# Patient Record
Sex: MALE | Race: WHITE | Employment: OTHER | URBAN - METROPOLITAN AREA
[De-identification: names, ages, dates, MRNs, and addresses within clinical notes are randomized per-mention and may not be internally consistent; named-entity substitution may affect disease eponyms.]

---

## 2017-06-05 ENCOUNTER — OFFICE VISIT (OUTPATIENT)
Dept: ORTHOPEDIC SURGERY | Age: 82
End: 2017-06-05

## 2017-06-05 VITALS
DIASTOLIC BLOOD PRESSURE: 74 MMHG | HEART RATE: 55 BPM | WEIGHT: 216 LBS | BODY MASS INDEX: 30.92 KG/M2 | SYSTOLIC BLOOD PRESSURE: 169 MMHG | HEIGHT: 70 IN

## 2017-06-05 DIAGNOSIS — M25.421 ELBOW SWELLING, RIGHT: Primary | ICD-10-CM

## 2017-06-05 DIAGNOSIS — M70.21 OLECRANON BURSITIS OF RIGHT ELBOW: ICD-10-CM

## 2017-06-05 RX ORDER — BETAMETHASONE SODIUM PHOSPHATE AND BETAMETHASONE ACETATE 3; 3 MG/ML; MG/ML
9 INJECTION, SUSPENSION INTRA-ARTICULAR; INTRALESIONAL; INTRAMUSCULAR; SOFT TISSUE ONCE
Qty: 1 VIAL | Refills: 0
Start: 2017-06-05 | End: 2017-06-05

## 2017-06-05 NOTE — PROGRESS NOTES
Patient: Taj Bajwa                MRN: [id number]       SSN: [id number]  YOB: 1935        AGE: 81 y.o. SEX: male  There is no height or weight on file to calculate BMI. PCP: Allan Reyes MD  06/05/17      No chief complaint on file. HISTORY OF PRESENT ILLNESS: Taj Bajwa is a 81 y.o. male who presents to the office for recurrent swelling of the posterior right elbow. It was aspirated on 2 separate occassions by the PCP to only have the fluid gradually return. He has had no falls or other trauma. He denies any pain. It is manly the cosmetic aspect of the problem that bothers him. Review of Systems   Constitutional: Negative. HENT: Negative. Eyes: Negative. Respiratory: Negative. Cardiovascular: Negative. Gastrointestinal: Negative. Genitourinary: Negative. Musculoskeletal: Positive for joint pain (right elbow). Skin: Negative. Neurological: Negative. Endo/Heme/Allergies: Negative. Psychiatric/Behavioral: Negative. Social History     Social History    Marital status:      Spouse name: N/A    Number of children: N/A    Years of education: N/A     Occupational History    Not on file. Social History Main Topics    Smoking status: Never Smoker    Smokeless tobacco: Never Used    Alcohol use No    Drug use: No    Sexual activity: Not on file     Other Topics Concern    Not on file     Social History Narrative        Past Medical History:   Diagnosis Date    Arrhythmia 2012    PAROX AFIB    Arthritis     CAD (coronary artery disease)     Cancer ([facility] 2000    BLADDER    Hypertension     Sleep apnea     NO CPAP    [facility]    NO RESIDUAL        Allergies   Allergen Reactions    Oxycodone Other (comments)     VOMITTING         Current Outpatient Prescriptions   Medication Sig    diphenhydrAMINE (BENADRYL) 25 mg capsule Take 25 mg by mouth nightly as needed.     amiodarone (CORDARONE) 200 mg tablet Take  by mouth daily.  omega-3 fatty acids-vitamin e 1,000 mg cap Take 1 Cap by mouth.  ramipril (ALTACE) 5 mg capsule Take  by mouth daily (with dinner).  rosuvastatin (CRESTOR) 40 mg tablet Take 40 mg by mouth nightly.  rivaroxaban (XARELTO) 20 mg tab tablet Take  by mouth daily (with dinner).  metoprolol succinate (TOPROL-XL) 50 mg XL tablet Take  by mouth daily (with dinner). No current facility-administered medications for this visit. Physical Exam  Constitutional: he is oriented to person, place, and time and well-developed, well-nourished, and in no distress. No distress. HENT:   Head: Normocephalic and atraumatic. Right Ear: Hearing normal.   Left Ear: Hearing normal.   Nose: Nose normal.   Eyes: Conjunctivae, EOM and lids are normal. Pupils are equal, round, and reactive to light. Neck: Trachea normal.   Pulmonary/Chest: Effort normal and breath sounds normal. No respiratory distress. Abdominal: Soft. Neurological: he is alert and oriented to person, place, and time. Skin: Skin is warm, dry and intact. he is not diaphoretic. Psychiatric: Affect normal.   Nursing note and vitals reviewed. Ortho Exam   Shows an aseptic right olecranon bursitis. Elbow ROM is normal. Palpation over the bursa reveals what appears to be a bone spur on the olecranon. Motor strength and sensation for the RUE were normal.     Procedure: After timeout and under sterile conditions, patient's right elbow was injected with 1.5 cc of Celestone.     [facility] [place]  OFFICE PROCEDURE PROGRESS NOTE        Chart reviewed for the following:   Kalpana Mckay MD, have reviewed the History, Physical and updated the Allergic reactions for [place] performed immediately prior to start of procedure:   Kalpnaa Mckay MD, have performed the following reviews on [place] [facility] prior to the start of the procedure: * Patient was identified by name and date of birth   * Agreement on procedure being performed was verified  * Risks and Benefits explained to the patient  * Procedure site verified and marked as necessary  * Patient was positioned for comfort  * Consent was signed and verified     Time: 4:45 PM        Date of procedure: 6/5/2017    Procedure performed by: Tonya Le MD    Provider assisted by: None     Patient assisted by: self    How tolerated by patient: tolerated the procedure well with no complications    Comments: none      RADIOGRAPHS:  Ap and lateral views show large osteophyte on the tip of the olecranon. IMPRESSION & PLAN: Aseptic olecranon bursitis secondary to the bone spur that is irritating the bursal lining. After discussing treatment options the pt wishes to undergo aspiration. This was performed after which 1.5 Celestone was injected to decrease the inflammation of the bursa lining. I explained if this continues to return the only option is to remove the current bursa and allow a new bursa to form. I will see him back prn. Written by Prashant Doe, as dictated by Dr. Jamarcus Mccracken, Dr. Tonya Le, confirm that all documentation is accurate.

## 2017-06-26 ENCOUNTER — OFFICE VISIT (OUTPATIENT)
Dept: ORTHOPEDIC SURGERY | Age: 82
End: 2017-06-26

## 2017-06-26 VITALS
WEIGHT: 218 LBS | SYSTOLIC BLOOD PRESSURE: 137 MMHG | DIASTOLIC BLOOD PRESSURE: 68 MMHG | HEIGHT: 70 IN | HEART RATE: 54 BPM | BODY MASS INDEX: 31.21 KG/M2

## 2017-06-26 DIAGNOSIS — M51.36 DEGENERATIVE DISC DISEASE, LUMBAR: Primary | ICD-10-CM

## 2017-06-26 DIAGNOSIS — M47.816 ARTHRITIS OF LUMBAR SPINE: ICD-10-CM

## 2017-06-26 DIAGNOSIS — G89.29 CHRONIC RIGHT-SIDED LOW BACK PAIN WITHOUT SCIATICA: ICD-10-CM

## 2017-06-26 DIAGNOSIS — M54.50 CHRONIC RIGHT-SIDED LOW BACK PAIN WITHOUT SCIATICA: ICD-10-CM

## 2017-06-26 RX ORDER — METHYLPREDNISOLONE 4 MG/1
TABLET ORAL
Qty: 20 DOSE PACK | Refills: 0 | Status: SHIPPED | OUTPATIENT
Start: 2017-06-26 | End: 2021-06-03

## 2017-06-26 NOTE — PROGRESS NOTES
Patient: Hannah Smith                MRN: [id number]       SSN: [id number]  YOB: 1935        AGE: 81 y.o. SEX: male  There is no height or weight on file to calculate BMI. PCP: Kevin Jeffers MD  06/26/17      No chief complaint on file. HISTORY OF PRESENT ILLNESS: Hannah Smith is a 81 y.o. male who presents to the office for flare up of his back pain and was riding in a tractor trailer. He was bumping up and down and this caused the onset of his back pain. He was able to allow the pain resolve by sitting in a reclining chair but after going to the beach it flared back up. It is localized in the right lumbar region. He has no radicular symptoms radiating into his LE's. Review of Systems   Constitutional: Negative. HENT: Negative. Eyes: Negative. Respiratory: Negative. Cardiovascular: Negative. Gastrointestinal: Negative. Genitourinary: Negative. Musculoskeletal: Positive for back pain. Skin: Negative. Neurological: Negative. Endo/Heme/Allergies: Negative. Psychiatric/Behavioral: Negative. Social History     Social History    Marital status:      Spouse name: N/A    Number of children: N/A    Years of education: N/A     Occupational History    Not on file.      Social History Main Topics    Smoking status: Never Smoker    Smokeless tobacco: Never Used    Alcohol use No    Drug use: No    Sexual activity: Not on file     Other Topics Concern    Not on file     Social History Narrative        Past Medical History:   Diagnosis Date    Arrhythmia 2012    PAROX AFIB    Arthritis     CAD (coronary artery disease)     Cancer ([facility] [address] [facility] 2000    BLADDER    Hypertension     Sleep apnea     NO CPAP    [facility]    NO RESIDUAL        Allergies   Allergen Reactions    Oxycodone Other (comments)     VOMITTING         Current Outpatient Prescriptions   Medication Sig    diphenhydrAMINE (BENADRYL) 25 mg capsule Take 25 mg by mouth nightly as needed.  amiodarone (CORDARONE) 200 mg tablet Take  by mouth daily.  omega-3 fatty acids-vitamin e 1,000 mg cap Take 1 Cap by mouth.  ramipril (ALTACE) 5 mg capsule Take  by mouth daily (with dinner).  rosuvastatin (CRESTOR) 40 mg tablet Take 40 mg by mouth nightly.  rivaroxaban (XARELTO) 20 mg tab tablet Take  by mouth daily (with dinner).  metoprolol succinate (TOPROL-XL) 50 mg XL tablet Take  by mouth daily (with dinner). No current facility-administered medications for this visit. Physical Exam  Constitutional: he is oriented to person, place, and time and well-developed, well-nourished, and in no distress. No distress. HENT:   Head: Normocephalic and atraumatic. Right Ear: Hearing normal.   Left Ear: Hearing normal.   Nose: Nose normal.   Eyes: Conjunctivae, EOM and lids are normal. Pupils are equal, round, and reactive to light. Neck: Trachea normal.   Pulmonary/Chest: Effort normal and breath sounds normal. No respiratory distress. Abdominal: Soft. Neurological: he is alert and oriented to person, place, and time. Skin: Skin is warm, dry and intact. he is not diaphoretic. Psychiatric: Affect normal.   Nursing note and vitals reviewed. Ortho Exam   Shows that leaning to the right significantly increased his discomfort. Back extension caused mild discomfort. Forward flexion was pain free. Sitting SLR was negative to 90 degrees. Sensation in both LE was normal and sensation for both LE was normal.     RADIOGRAPHS:   XR's from [place] [facility] showed the same severe degenerative changes with no new findings. 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  We will repeat the medrol dose but darling again I reccommended him to avoid any activity that puts stress on his back. I will see him back prn. Written by Gali Levi, as dictated by Dr. Greenfield Poster, Dr. Jae Cushing, confirm that all documentation is accurate.

## 2017-08-03 ENCOUNTER — OFFICE VISIT (OUTPATIENT)
Dept: ORTHOPEDIC SURGERY | Age: 82
End: 2017-08-03

## 2017-08-03 VITALS
BODY MASS INDEX: 30.9 KG/M2 | HEART RATE: 56 BPM | HEIGHT: 70 IN | RESPIRATION RATE: 18 BRPM | SYSTOLIC BLOOD PRESSURE: 113 MMHG | WEIGHT: 215.8 LBS | OXYGEN SATURATION: 94 % | DIASTOLIC BLOOD PRESSURE: 58 MMHG

## 2017-08-03 DIAGNOSIS — M70.21 OLECRANON BURSITIS OF RIGHT ELBOW: Primary | ICD-10-CM

## 2017-08-03 RX ORDER — TRIAMCINOLONE ACETONIDE 40 MG/ML
40 INJECTION, SUSPENSION INTRA-ARTICULAR; INTRAMUSCULAR ONCE
Qty: 1 ML | Refills: 0
Start: 2017-08-03 | End: 2017-08-03

## 2017-08-03 NOTE — PROGRESS NOTES
HISTORY:  Nghia Keys returns for followup regarding his right elbow. He has been plagued with a recurrence of swelling to the right elbow consistent with olecranon bursitis. He has been treated under the care of Dr. Graham Roberts in the past and had prior aspirations with followup Cortisone injections. He is a  by [employer]. 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 He believes that resting his arm on the side of the machine while operating causes pressure and therefore leads to his increased swelling. With swelling he has pain and limitation of motion of the right elbow. 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 He would like to entertain surgical debridement and removal/bursectomy so to avoid any further reoccurrence. REVIEW OF SYSTEMS:  No chest pain. He does have exertional dyspnea, chronic in nature. No fevers, chills, or night sweats. He does have A-fib. He is on Xarelto. He is not a diabetic. He has no allergies to Betadine. He has no nausea or vomiting. EXAMINATION:  Physical examination today reveals an 81year-old, generally fit,  male, atraumatic and normocephalic. He is alert and oriented x 3. He is joined by his wife today. Examination to the right upper extremity reveals about the shoulder a well-healed surgical incision, non-tender. There is no evidence of erythema associated with the right upper extremity to include the elbow. There is a 2+ effusion about the right elbow. His right elbow range of motion actively is 110° to - 5°. He has no induration associated with the effusion. There is no lymphangetic streaking associated. No axillary lymphadenopathy.      TREATMENT:  Today, using sterile technique after verbal and written consent was obtained, risks and benefits discussed and all questions were answered to his satisfaction with appropriate time-out performed, 5 cc of Lidocaine 1% was used to anesthetize the right elbow with the patient in the supine position. There were no complications. To follow, 12 cc of thin bloody aspirate removed. To follow, 2 mL of 0.5% Marcaine mixed with 1 cc of Kenalog injected into the olecranon bursa region. There were no complications. IMPRESSION:   1. 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 2. Right elbow effusion. 3. Right elbow range of motion decreased secondary to above. 4. A-fib. Patient is stable on Xarelto. PLAN:  I would like to aspirate the elbow today and provide him with relief by placing a small dose of Cortisone. In addition, in light of his multiple recurrences, he would be a candidate after being cleared by cardiology for a right elbow bursectomy, outpatient. We discussed the procedure today, and all of his questions were answered to his satisfaction. Patient will be scheduled with Dr. Kassie Miller for evaluation and open elective olecranon bursectomy of the right elbow. All of the patient's and his wife's questions were answered to their satisfaction.

## 2017-08-03 NOTE — MR AVS SNAPSHOT
Visit Information Date & Time Provider Department Dept. Phone Encounter #  
 8/3/2017  8:45 AM Александр Landeros, [address] [facility] [phone] [id number] Upcoming Health Maintenance Date Due DTaP/Tdap/Td series (1 - Tdap) 3/29/1957 ZOSTER VACCINE AGE 60> 1/29/1996 GLAUCOMA SCREENING Q2Y 3/29/2001 Pneumococcal 65+ Low/Medium Risk (1 of 2 - PCV13) 3/29/2001 MEDICARE YEARLY EXAM 3/29/2001 INFLUENZA AGE 9 TO ADULT 8/1/2017 Allergies as of 8/3/2017  Review Complete On: 8/3/2017 By: Bladimir Rossi LPN Severity Noted Reaction Type Reactions Oxycodone  05/05/2016    Other (comments) VOMITTING Current Immunizations  Never Reviewed No immunizations on file. Not reviewed this visit Vitals BP Pulse Resp Height(growth percentile) Weight(growth percentile) SpO2  
 113/58 (!) 56 18 5' 10\" (1.778 m) 215 lb 12.8 oz (97.9 kg) 94% BMI Smoking Status 30.96 kg/m2 Never Smoker BMI and BSA Data Body Mass Index Body Surface Area 30.96 kg/m 2 2.2 m 2 Preferred Pharmacy Pharmacy Name Phone [id number] [address] [facility] [phone] Your Updated Medication List  
  
   
This list is accurate as of: 8/3/17  9:38 AM.  Always use your most recent med list.  
  
  
  
  
 amiodarone 200 mg tablet Commonly known as:  CORDARONE Take  by mouth daily. diphenhydrAMINE 25 mg capsule Commonly known as:  BENADRYL Take 25 mg by mouth nightly as needed. methylPREDNISolone 4 mg tablet Commonly known as:  Sharyn Sabino Per dose pack instructions  
  
 metoprolol succinate 50 mg XL tablet Commonly known as:  TOPROL-XL Take  by mouth daily (with dinner). omega-3 fatty acids-vitamin e 1,000 mg Cap Take 1 Cap by mouth. ramipril 5 mg capsule Commonly known as:  ALTACE  
 Take  by mouth daily (with dinner). rivaroxaban 20 mg Tab tablet Commonly known as:  [place] Take  by mouth daily (with dinner). rosuvastatin 40 mg tablet Commonly known as:  CRESTOR Take 40 mg by mouth nightly. Introducing [facility]! [facility] introduces [employer] patient portal. Now you can access parts of your medical record, email your doctor's office, and request medication refills online. 1. In your internet browser, go to [email or web address] 2. Click on the First Time User? Click Here link in the Sign In box. You will see the New Member Sign Up page. 3. Enter your [employer] Access Code exactly as it appears below. You will not need to use this code after youve completed the sign-up process. If you do not sign up before the expiration date, you must request a new code. · [employer] Access Code: [email or web address] Expires: 9/3/2017  3:04 PM 
 
4. Enter the last four digits of your Social Security Number (xxxx) and Date of Birth (mm/dd/yyyy) as indicated and click Submit. You will be taken to the next sign-up page. 5. Create a [employer] ID. This will be your [employer] login ID and cannot be changed, so think of one that is secure and easy to remember. 6. Create a [employer] password. You can change your password at any time. 7. Enter your Password Reset Question and Answer. This can be used at a later time if you forget your password. 8. Enter your e-mail address. You will receive e-mail notification when new information is available in [address]. 9. Click Sign Up. You can now view and download portions of your medical record. 10. Click the Download Summary menu link to download a portable copy of your medical information. If you have questions, please visit the Frequently Asked Questions section of the [employer] website. Remember, [employer] is NOT to be used for urgent needs. For medical emergencies, dial 911. Now available from your iPhone and Android! Please provide this summary of care documentation to your next provider. Your primary care clinician is listed as Barbie Solis. If you have any questions after today's visit, please call [phone].

## 2017-08-09 ENCOUNTER — OFFICE VISIT (OUTPATIENT)
Dept: ORTHOPEDIC SURGERY | Age: 82
End: 2017-08-09

## 2017-08-09 VITALS
HEART RATE: 54 BPM | WEIGHT: 214.8 LBS | HEIGHT: 70 IN | DIASTOLIC BLOOD PRESSURE: 66 MMHG | BODY MASS INDEX: 30.75 KG/M2 | SYSTOLIC BLOOD PRESSURE: 132 MMHG | OXYGEN SATURATION: 97 % | TEMPERATURE: 96 F | RESPIRATION RATE: 18 BRPM

## 2017-08-09 DIAGNOSIS — M70.21 OLECRANON BURSITIS OF RIGHT ELBOW: Primary | ICD-10-CM

## 2017-08-09 DIAGNOSIS — M25.421 ELBOW SWELLING, RIGHT: ICD-10-CM

## 2017-08-09 NOTE — PROGRESS NOTES
Patient: Messi Jaramillo                MRN: [id number]       SSN: [id number]  YOB: 1935        AGE: 81 y.o. SEX: male    PCP: Skinny Borges MD  08/09/17    Chief Complaint   Patient presents with    Elbow Pain     Right     HISTORY:  Messi Jaramillo is a 81 y.o. male who is seen for right elbow effusion. He was previously seen by Dr. Brent Obando and NIYAH Cornejo on 8/3/17. He has had his right olecranon bursa aspirated four times total. He reports he has had 2 cortisone injections. He states he has been experiencing recurrent swelling of the olecranon bursa for the past year. He denies any pain in his right elbow. He reports the swelling comes back in a few days every time he has his elbow aspirated. He and his wife states they are more bothered by the appearance of the swelling than any other symptomotology. He is s/p successful right reverse shoulder arthroplasty at [facility] by Dr. May Frias. Pain Assessment  6/26/2017   Location of Pain Back   Location Modifiers -   Severity of Pain 0   Quality of Pain Aching   Duration of Pain -   Frequency of Pain Intermittent   Aggravating Factors Standing;Walking   Limiting Behavior Some   Relieving Factors Rest   Result of Injury No     Occupation, etc:  Mr. Deidre Fofana he works part time for [employer]. 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 His wife states he is a workaholic. He lives with his wife in [place]. They have three adult sons and 7 grandchildren. One son works as a supervisor at the [employer], another at the [employer]. The youngest son (Donald) owns the [employer]. Current weight is 214 pounds. He is 5'10\" tall. He is not hypertensive or diabetic. Patient is hearing impaired.     No results found for: HBA1C, HGBE8, LTS6BTVX, IRG7WRUU, ZVV7AFPV  Weight Metrics 8/9/2017 8/3/2017 6/26/2017 6/5/2017 5/9/2016 4/18/2016 4/11/2016   Weight 214 lb 12.8 oz 215 lb 12.8 oz 218 lb 216 lb 207 lb 213 lb 215 lb   BMI 30.82 kg/m2 30.96 kg/m2 31.28 kg/m2 30.99 kg/m2 29.7 kg/m2 29.72 kg/m2 30 kg/m2       There is no problem list on file for this patient. REVIEW OF SYSTEMS: All Below are Negative except: See HPI   Constitutional: negative for fever, chills, and weight loss. Cardiovascular: negative for chest pain, claudication, leg swelling, SOB, ENCARNACION   Gastrointestinal: Negative for pain, N/V/C/D, Blood in stool or urine, dysuria,  hematuria, incontinence, pelvic pain. Musculoskeletal: See HPI   Neurological: Negative for dizziness and weakness. Negative for headaches, Visual changes, confusion, seizures   Phychiatric/Behavioral: Negative for depression, memory loss, substance  abuse. Extremities: Negative for hair changes, rash, or skin lesion changes. Hematologic: Negative for bleeding problems, bruising, pallor or swollen lymph  nodes   Peripheral Vascular: No calf pain, no circulation deficits. Social History     Social History    Marital status:      Spouse name: N/A    Number of children: N/A    Years of education: N/A     Occupational History    Not on file. Social History Main Topics    Smoking status: Never Smoker    Smokeless tobacco: Never Used    Alcohol use No    Drug use: No    Sexual activity: Not on file     Other Topics Concern    Not on file     Social History Narrative      Allergies   Allergen Reactions    Oxycodone Other (comments)     VOMITTING      Current Outpatient Prescriptions   Medication Sig    diphenhydrAMINE (BENADRYL) 25 mg capsule Take 25 mg by mouth nightly as needed.  amiodarone (CORDARONE) 200 mg tablet Take  by mouth daily.  omega-3 fatty acids-vitamin e 1,000 mg cap Take 1 Cap by mouth.  ramipril (ALTACE) 5 mg capsule Take  by mouth daily (with dinner).     rosuvastatin (CRESTOR) 40 mg tablet Take 40 mg by mouth nightly.  rivaroxaban (XARELTO) 20 mg tab tablet Take  by mouth daily (with dinner).  metoprolol succinate (TOPROL-XL) 50 mg XL tablet Take  by mouth daily (with dinner).  methylPREDNISolone (MEDROL DOSEPACK) 4 mg tablet Per dose pack instructions (Patient not taking: Reported on 8/3/2017)     No current facility-administered medications for this visit. PHYSICAL EXAMINATION:  Visit Vitals    /66    Pulse (!) 54    Temp 96 °F (35.6 °C) (Oral)    Resp 18    Ht 5' 10\" (1.778 m)    Wt 214 lb 12.8 oz (97.4 kg)    SpO2 97%    BMI 30.82 kg/m2      ORTHO EXAMINATION:  Examination Right Elbow Left Elbow   Skin Intact Intact   Range of Motion 135-0 135-0   Tenderness - -   Swelling +, olecranon bursa -   Bruising - -   Stability Normal Normal   Motor Strength  Normal Normal   Neurovascular Intact Intact   Mild thickening of olecranon bursal sac  Prominent olecranon enthesmophyte  80/80 pronation/supination    RADIOGRAPHS:  XR RIGHT ELBOW 6/5/17  IMPRESSION:  Two views - No fractures, negative fat pad sign, no joint space narrowing. Large olecranon enthesmophyte. IMPRESSION:      ICD-10-CM ICD-9-CM    1. Olecranon bursitis of right elbow M70.21 726.33    2. Elbow swelling, right M25.421 719.02      PLAN:  We discussed a possible right olecranon bursa removal in the future if pain continues--symptoms do not warrant at this time. He will follow up as needed.       Scribed by Gissell Garza ([facility]) as dictated by Todd Tillman MD

## 2017-08-09 NOTE — MR AVS SNAPSHOT
Visit Information Date & Time Provider Department Dept. Phone Encounter #  
 8/9/2017  8:25 AM Lovey Kanner, MD [facility] [phone] Follow-up Instructions Return if symptoms worsen or fail to improve. Upcoming Health Maintenance Date Due DTaP/Tdap/Td series (1 - Tdap) 3/29/1957 ZOSTER VACCINE AGE 60> 1/29/1996 GLAUCOMA SCREENING Q2Y 3/29/2001 Pneumococcal 65+ Low/Medium Risk (1 of 2 - PCV13) 3/29/2001 MEDICARE YEARLY EXAM 3/29/2001 INFLUENZA AGE 9 TO ADULT 8/1/2017 Allergies as of 8/9/2017  Review Complete On: 8/9/2017 By: Gretta Rock Severity Noted Reaction Type Reactions Oxycodone  05/05/2016    Other (comments) VOMITTING Current Immunizations  Never Reviewed No immunizations on file. Not reviewed this visit You Were Diagnosed With   
  
 Codes Comments Olecranon bursitis of right elbow    -  Primary ICD-10-CM: M70.21 ICD-9-CM: 726.33 Elbow swelling, right     ICD-10-CM: M25.421 ICD-9-CM: 719.02 Vitals BP Pulse Temp Resp Height(growth percentile) Weight(growth percentile) 132/66 (!) 54 96 °F (35.6 °C) (Oral) 18 5' 10\" (1.778 m) 214 lb 12.8 oz (97.4 kg) SpO2 BMI Smoking Status 97% 30.82 kg/m2 Never Smoker BMI and BSA Data Body Mass Index Body Surface Area  
 30.82 kg/m 2 2.19 m 2 Preferred Pharmacy Pharmacy Name Phone [id number] [address] [facility] [phone] Your Updated Medication List  
  
   
This list is accurate as of: 8/9/17  9:01 AM.  Always use your most recent med list.  
  
  
  
  
 amiodarone 200 mg tablet Commonly known as:  CORDARONE Take  by mouth daily. diphenhydrAMINE 25 mg capsule Commonly known as:  BENADRYL Take 25 mg by mouth nightly as needed. methylPREDNISolone 4 mg tablet Commonly known as:  Lucian Doan Per dose pack instructions  
  
 metoprolol succinate 50 mg XL tablet Commonly known as:  TOPROL-XL Take  by mouth daily (with dinner). omega-3 fatty acids-vitamin e 1,000 mg Cap Take 1 Cap by mouth. ramipril 5 mg capsule Commonly known as:  ALTACE Take  by mouth daily (with dinner). rivaroxaban 20 mg Tab tablet Commonly known as:  Héctor Guidoe Take  by mouth daily (with dinner). rosuvastatin 40 mg tablet Commonly known as:  CRESTOR Take 40 mg by mouth nightly. Follow-up Instructions Return if symptoms worsen or fail to improve. Patient Instructions Elbow Bursitis: Exercises Your Care Instructions Here are some examples of typical rehabilitation exercises for your condition. Start each exercise slowly. Ease off the exercise if you start to have pain. Your doctor or physical therapist will tell you when you can start these exercises and which ones will work best for you. How to do the exercises Elbow flexion stretch 1. Lift the arm that bothers you, and bend the elbow. Your palm should face toward you. 2. With your other hand, gently push on the back of your affected forearm. Press your hand toward your shoulder until you feel a stretch in the back of your upper arm. 3. Hold for at least 15 to 30 seconds. 4. Repeat 2 to 4 times. Elbow extension stretch 1. Extend your affected arm in front of you with your palm facing away from you. 2. Bend back your wrist, pointing your hand up toward the ceiling. 3. With your other hand, gently bend your wrist farther until you feel a mild to moderate stretch in your forearm. 4. Hold for at least 15 to 30 seconds. 5. Repeat 2 to 4 times. 6. Repeat steps 1 through 5. But this time extend your affected arm in front of you with your palm facing up. Then bend back your wrist, pointing your hand toward the floor. Pronation and supination stretch 1. Keep your affected elbow at your side, bent at about 90 degrees. Grasp a pen, pencil, or stick, and wrap your hand around it. If you don't have something to hold on to, make a fist instead. 2. Slowly turn your forearm as far as you can back and forth in each direction. Your hand should face up and then down. 3. Hold each position for about 6 seconds. 4. Relax for up to 10 seconds between repetitions. 5. Repeat 8 to 12 times. Hand flips 1. While seated, place your affected forearm on your thigh. Your palm should face down. 2. Flip your hand over so the back of your hand rests on your thigh and your palm is up. Alternate between palm up and palm down while keeping your forearm on your thigh. 3. Repeat 8 to 12 times. Follow-up care is a key part of your treatment and safety. Be sure to make and go to all appointments, and call your doctor if you are having problems. It's also a good idea to know your test results and keep a list of the medicines you take. Where can you learn more? Go to http://johann-dudley.info/. Enter K053 in the search box to learn more about \"Elbow Bursitis: Exercises. \" Current as of: March 21, 2017 Content Version: 11.3 © 7545-2066 [employer]. Care instructions adapted under license by [employer] (which disclaims liability or warranty for this information). If you have questions about a medical condition or this instruction, always ask your healthcare professional. [place] [address] any warranty or liability for your use of this information. Introducing [facility] & HEALTH SERVICES! Cailin Meadows introduces [employer] patient portal. Now you can access parts of your medical record, email your doctor's office, and request medication refills online. 1. In your internet browser, go to https://[employer]. [employer] 2. Click on the First Time User? Click Here link in the Sign In box.  You will see the New Member Sign Up page. 3. Enter your [employer] Access Code exactly as it appears below. You will not need to use this code after youve completed the sign-up process. If you do not sign up before the expiration date, you must request a new code. · [employer] Access Code: [email or web address] Expires: 9/3/2017  3:04 PM 
 
4. Enter the last four digits of your Social Security Number (xxxx) and Date of Birth (mm/dd/yyyy) as indicated and click Submit. You will be taken to the next sign-up page. 5. Create a [employer] ID. This will be your [employer] login ID and cannot be changed, so think of one that is secure and easy to remember. 6. Create a [employer] password. You can change your password at any time. 7. Enter your Password Reset Question and Answer. This can be used at a later time if you forget your password. 8. Enter your e-mail address. You will receive e-mail notification when new information is available in [address]. 9. Click Sign Up. You can now view and download portions of your medical record. 10. Click the Download Summary menu link to download a portable copy of your medical information. If you have questions, please visit the Frequently Asked Questions section of the [employer] website. Remember, [employer] is NOT to be used for urgent needs. For medical emergencies, dial 911. Now available from your iPhone and Android! Please provide this summary of care documentation to your next provider. Your primary care clinician is listed as Nestor Bunch. If you have any questions after today's visit, please call [phone].

## 2017-11-03 ENCOUNTER — HOSPITAL ENCOUNTER (OUTPATIENT)
Dept: MRI IMAGING | Age: 82
Discharge: HOME | End: 2017-11-03
Attending: ORTHOPAEDIC SURGERY
Payer: MEDICARE

## 2017-11-03 DIAGNOSIS — M54.16 LUMBAR RADICULITIS: ICD-10-CM

## 2017-11-03 PROCEDURE — 72148 MRI LUMBAR SPINE W/O DYE: CPT

## 2019-04-29 ENCOUNTER — OFFICE VISIT (OUTPATIENT)
Dept: ORTHOPEDIC SURGERY | Facility: CLINIC | Age: 84
End: 2019-04-29

## 2019-04-29 VITALS
TEMPERATURE: 97.6 F | HEIGHT: 70 IN | HEART RATE: 65 BPM | DIASTOLIC BLOOD PRESSURE: 73 MMHG | SYSTOLIC BLOOD PRESSURE: 151 MMHG | WEIGHT: 202.6 LBS | OXYGEN SATURATION: 100 % | RESPIRATION RATE: 16 BRPM | BODY MASS INDEX: 29.01 KG/M2

## 2019-04-29 DIAGNOSIS — M48.062 SPINAL STENOSIS OF LUMBAR REGION WITH NEUROGENIC CLAUDICATION: ICD-10-CM

## 2019-04-29 DIAGNOSIS — M54.32 LEFT SIDED SCIATICA: Primary | ICD-10-CM

## 2019-04-29 DIAGNOSIS — M16.12 ARTHRITIS OF LEFT HIP: ICD-10-CM

## 2019-04-29 RX ORDER — BETAMETHASONE SODIUM PHOSPHATE AND BETAMETHASONE ACETATE 3; 3 MG/ML; MG/ML
6 INJECTION, SUSPENSION INTRA-ARTICULAR; INTRALESIONAL; INTRAMUSCULAR; SOFT TISSUE ONCE
Qty: 0.5 ML | Refills: 0
Start: 2019-04-29 | End: 2019-04-29

## 2019-04-29 RX ORDER — BETAMETHASONE SODIUM PHOSPHATE AND BETAMETHASONE ACETATE 3; 3 MG/ML; MG/ML
6 INJECTION, SUSPENSION INTRA-ARTICULAR; INTRALESIONAL; INTRAMUSCULAR; SOFT TISSUE ONCE
Qty: 1 VIAL | Refills: 0
Start: 2019-04-29 | End: 2019-04-29

## 2019-04-29 NOTE — PROGRESS NOTES
Patient: Hazel Cohen                MRN: [id number]       SSN: [id number] YOB: 1935        AGE: 81 y.o. SEX: male PCP: Judah Parker MD 
04/29/19 Chief Complaint Patient presents with  
 Hip Pain  
  left hip pain that travels down leg HISTORY:  Hazel Cohen is a 81 y.o. male who is seen for left buttock and left LE pain. He has been experiencing left buttock pain for the past four weeks. There is no history injury. He notes pain with standing and walking. He experiences no groin pain or lateral hip pain. He was seen at [facility] in [place] where an Xray was taken that revealed only mild hip arthritis. He underwent lumbar laminectomy in 1994 by a doctor in [place]. He also complains of numbness in his fingers on the left. He is being seen by Dr. Wade Stevenson for a left carpal tunnel release. He was previously seen by Dr. Newton Alvarado and NIYAH Harris on 8/3/17. He has had his right olecranon bursa aspirated four times total. He reports he has had 2 cortisone injections. He states he has been experiencing recurrent swelling of the olecranon bursa for the past year. He denies any pain in his right elbow. He reports the swelling comes back in a few days every time he has his elbow aspirated. He and his wife states they are more bothered by the appearance of the swelling than any other symptomotology. He is s/p [place] [facility] [place]eferino Zambrano. Pain Assessment  4/29/2019 Location of Pain Hip Location Modifiers Left Severity of Pain 9 Quality of Pain Dull Duration of Pain Persistent Frequency of Pain Intermittent Aggravating Factors Walking;Standing;Stairs Limiting Behavior Yes Relieving Factors Rest  
Result of Injury No  
 
Occupation, etc:  Mr. Joan Low he works part time for [employer].   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 His wife states he is a workaholic. He lives with his wife in [place]. They have three adult sons and 7 grandchildren. One son works as a supervisor at the [employer], another at the [employer]. The youngest son (Donald) owns the [address] [place] [address] [employer]. Current weight is 202 pounds. He is 5'10\" tall. He is not hypertensive or diabetic. Patient is hearing impaired. No results found for: HBA1C, HGBE8, AWW3OTBS, QQF4JGPS, ABN2TUIA Weight Metrics 4/29/2019 8/9/2017 8/3/2017 6/26/2017 6/5/2017 5/9/2016 4/18/2016 Weight 202 lb 9.6 oz 214 lb 12.8 oz 215 lb 12.8 oz 218 lb 216 lb 207 lb 213 lb BMI 29.07 kg/m2 30.82 kg/m2 30.96 kg/m2 31.28 kg/m2 30.99 kg/m2 29.7 kg/m2 29.72 kg/m2 There is no problem list on file for this patient. REVIEW OF SYSTEMS: All Below are Negative except: See HPI Constitutional: negative for fever, chills, and weight loss. Cardiovascular: negative for chest pain, claudication, leg swelling, SOB, ENCARNACION Gastrointestinal: Negative for pain, N/V/C/D, Blood in stool or urine, dysuria,  hematuria, incontinence, pelvic pain. Musculoskeletal: See HPI Neurological: Negative for dizziness and weakness. Negative for headaches, Visual changes, confusion, seizures Phychiatric/Behavioral: Negative for depression, memory loss, substance  abuse. Extremities: Negative for hair changes, rash, or skin lesion changes. Hematologic: Negative for bleeding problems, bruising, pallor or swollen lymph  nodes Peripheral Vascular: No calf pain, no circulation deficits. Social History Socioeconomic History  Marital status:  Spouse name: Not on file  Number of children: Not on file  Years of education: Not on file  Highest education level: Not on file Occupational History  Not on file Social Needs  Financial resource strain: Not on file  Food insecurity:  
  Worry: Not on file Inability: Not on file  Transportation needs:  
  Medical: Not on file Non-medical: Not on file Tobacco Use  Smoking status: Never Smoker  Smokeless tobacco: Never Used Substance and Sexual Activity  Alcohol use: No  
  Alcohol/week: 0.0 oz  Drug use: No  
 Sexual activity: Not on file Lifestyle  Physical activity:  
  Days per week: Not on file Minutes per session: Not on file  Stress: Not on file Relationships  Social connections:  
  Talks on phone: Not on file Gets together: Not on file Attends Evangelical service: Not on file Active member of club or organization: Not on file Attends meetings of clubs or organizations: Not on file Relationship status: Not on file  Intimate partner violence:  
  Fear of current or ex partner: Not on file Emotionally abused: Not on file Physically abused: Not on file Forced sexual activity: Not on file Other Topics Concern  Not on file Social History Narrative  Not on file Allergies Allergen Reactions  Oxycodone Other (comments) VOMITTING Current Outpatient Medications Medication Sig  
 betamethasone (CELESTONE SOLUSPAN) 6 mg/mL injection 1 mL by IntraBURSal route once for 1 dose.  betamethasone (CELESTONE SOLUSPAN) 6 mg/mL injection 1 mL by Intra artICUlar route once for 1 dose.  diphenhydrAMINE (BENADRYL) 25 mg capsule Take 25 mg by mouth nightly as needed.  amiodarone (CORDARONE) 200 mg tablet Take  by mouth daily.  omega-3 fatty acids-vitamin e 1,000 mg cap Take 1 Cap by mouth.  ramipril (ALTACE) 5 mg capsule Take  by mouth daily (with dinner).  rosuvastatin (CRESTOR) 40 mg tablet Take 40 mg by mouth nightly.  rivaroxaban (XARELTO) 20 mg tab tablet Take  by mouth daily (with dinner).   
 methylPREDNISolone (MEDROL DOSEPACK) 4 mg tablet Per dose pack instructions (Patient not taking: Reported on 8/3/2017)  metoprolol succinate (TOPROL-XL) 50 mg XL tablet Take  by mouth daily (with dinner). No current facility-administered medications for this visit. PHYSICAL EXAMINATION: 
Visit Vitals /73 Pulse 65 Temp 97.6 °F (36.4 °C) (Oral) Resp 16 Ht 5' 10\" (1.778 m) Wt 202 lb 9.6 oz (91.9 kg) SpO2 100% BMI 29.07 kg/m² ORTHO EXAMINATION: 
Examination Lumbar Thoracic Skin Intact Intact Tenderness + left iliolumbar - Tightness - - Lordosis Normal N/A Kyphosis N/A Normal  
Scoliosis - - Flexion Fingertips to mid shin N/A Extension 10 N/A Knee reflexes Normal N/A Ankle reflexes Normal N/A Straight leg raise - N/A Calf tenderness - N/A Examination Right hip Left hip Skin Intact Intact External Rotation ROM 20 10 Internal Rotation ROM 10 10 Trochanteric tenderness - - Hip flexion contracture - - Antalgic gait - - Trendelenberg sign - - Lumbar tenderness - Iliolumbar tightness Straight leg raise - - Calf tenderness - - Neurovascular Intact Intact Examination Right Elbow Left Elbow Skin Intact Intact Range of Motion 135-0 135-0 Tenderness - - Swelling +, olecranon bursa - Bruising - - Stability Normal Normal  
Motor Strength  Normal Normal  
Neurovascular Intact Intact Mild thickening of olecranon bursal sac Prominent olecranon enthesmophyte 80/80 pronation/supination Chart reviewed for the following: 
 Jamey Kanner, MD, have reviewed the History, Physical and updated the Allergic reactions for [address] performed immediately prior to start of procedure: 
Jamey Kanner, MD, have performed the following reviews on Esperanza Mendoza prior to the start of the procedure: 
         
* Patient was identified by name and date of birth * Agreement on procedure being performed was verified * Risks and Benefits explained to the patient * Procedure site verified and marked as necessary * Patient was positioned for comfort * Consent was obtained Time: 1:16 PM  
 
Date of procedure: 4/29/2019 Procedure performed by:  Veronica Crawford MD 
 
Mr. Faith Hernández tolerated the procedure well with no complications. RADIOGRAPHS: 
XR HIP UNI 4/19/2019 IMPRESSION: Mild osteoarthritic changes of the hips. No fracture.  
-I have independently reviewed these images during this office visit. -Dr. Doylene Meigs IMPRESSION:  AP pelvis and two views - No fractures, mild joint space narrowing, + osteophytes present. Tonnis grade 1 XR RIGHT ELBOW 6/5/17 IMPRESSION:  Two views - No fractures, negative fat pad sign, no joint space narrowing. Large olecranon enthesmophyte. IMPRESSION:   
  ICD-10-CM ICD-9-CM 1. Left sided sciatica M54.32 724.3 REFERRAL TO SPINE SURGERY  
   REFERRAL TO PHYSICAL THERAPY INJECT TRIGGER POINT, 1 OR 2  
   betamethasone (CELESTONE SOLUSPAN) 6 mg/mL injection BETAMETHASONE ACETATE & SODIUM PHOSPHATE INJECTION 3 MG EA.  
2. Spinal stenosis of lumbar region with neurogenic claudication M48.062 724.03 REFERRAL TO SPINE SURGERY  
   REFERRAL TO PHYSICAL THERAPY INJECT TRIGGER POINT, 1 OR 2  
   betamethasone (CELESTONE SOLUSPAN) 6 mg/mL injection BETAMETHASONE ACETATE & SODIUM PHOSPHATE INJECTION 3 MG EA.  
3. Arthritis of left hip M16.12 716.95 betamethasone (CELESTONE SOLUSPAN) 6 mg/mL injection WA DRAIN/INJECT LARGE JOINT/BURSA CANCELED: BETAMETHASONE ACETATE & SODIUM PHOSPHATE INJECTION 3 MG EA. PLAN:  After discussing treatment options, patient's left iliolumbar was injected with 4 cc Marcaine and 1/2 cc Celestone. Referred to physical therapy for his back pain. He will follow up at the spine center. He will follow up as needed.    
 
Scribed by Rashaad Hammer ([address]) as dictated by Veronica Crawford MD

## 2019-05-06 ENCOUNTER — OFFICE VISIT (OUTPATIENT)
Dept: ORTHOPEDIC SURGERY | Age: 84
End: 2019-05-06

## 2019-05-06 VITALS
RESPIRATION RATE: 24 BRPM | TEMPERATURE: 97.8 F | HEIGHT: 70 IN | HEART RATE: 60 BPM | SYSTOLIC BLOOD PRESSURE: 127 MMHG | WEIGHT: 204 LBS | BODY MASS INDEX: 29.2 KG/M2 | OXYGEN SATURATION: 99 % | DIASTOLIC BLOOD PRESSURE: 83 MMHG

## 2019-05-06 DIAGNOSIS — M48.061 SPINAL STENOSIS OF LUMBAR REGION WITHOUT NEUROGENIC CLAUDICATION: Primary | ICD-10-CM

## 2019-05-06 DIAGNOSIS — M54.17 LUMBOSACRAL NEURITIS: ICD-10-CM

## 2019-05-06 DIAGNOSIS — M51.36 DDD (DEGENERATIVE DISC DISEASE), LUMBAR: ICD-10-CM

## 2019-05-06 RX ORDER — TRAMADOL HYDROCHLORIDE 50 MG/1
50 TABLET ORAL
COMMUNITY
Start: 2018-04-20 | End: 2021-06-03

## 2019-05-06 RX ORDER — GABAPENTIN 100 MG/1
100 CAPSULE ORAL 3 TIMES DAILY
Qty: 90 CAP | Refills: 1 | Status: SHIPPED | OUTPATIENT
Start: 2019-05-06 | End: 2021-06-03

## 2019-05-06 NOTE — PROGRESS NOTES
1. Have you been to the ER, urgent care clinic since your last visit? Hospitalized since your last visit? Yes When: 04/2019 Left leg pain Urgent care 2. Have you seen or consulted any other health care providers outside of the [address] [place] [address] since your last visit? Include any pap smears or colon screening. Yes When: 04/2019 Urgent care Left leg pain

## 2019-05-06 NOTE — PATIENT INSTRUCTIONS

## 2019-05-08 ENCOUNTER — TELEPHONE (OUTPATIENT)
Dept: ORTHOPEDIC SURGERY | Age: 84
End: 2019-05-08

## 2019-05-08 NOTE — TELEPHONE ENCOUNTER
PT office called in checking to see if the Plan of care was signed that was sent to [facility] [phone]. She ask that it just be filled out and faxed back.

## 2019-05-09 NOTE — TELEPHONE ENCOUNTER
[facility] needs to follow up on Plan of Care, patient has Medicare and form needs to be received this month or they will need to attest.  Please complete as soon as possible and fax to them at [phone]

## 2021-06-03 ENCOUNTER — APPOINTMENT (OUTPATIENT)
Dept: CT IMAGING | Age: 86
DRG: 307 | End: 2021-06-03
Attending: FAMILY MEDICINE
Payer: MEDICARE

## 2021-06-03 ENCOUNTER — APPOINTMENT (OUTPATIENT)
Dept: GENERAL RADIOLOGY | Age: 86
DRG: 307 | End: 2021-06-03
Attending: STUDENT IN AN ORGANIZED HEALTH CARE EDUCATION/TRAINING PROGRAM
Payer: MEDICARE

## 2021-06-03 ENCOUNTER — APPOINTMENT (OUTPATIENT)
Dept: NON INVASIVE DIAGNOSTICS | Age: 86
DRG: 307 | End: 2021-06-03
Attending: STUDENT IN AN ORGANIZED HEALTH CARE EDUCATION/TRAINING PROGRAM
Payer: MEDICARE

## 2021-06-03 ENCOUNTER — HOSPITAL ENCOUNTER (INPATIENT)
Age: 86
LOS: 1 days | Discharge: ANOTHER HOSPITAL | DRG: 307 | End: 2021-06-04
Attending: STUDENT IN AN ORGANIZED HEALTH CARE EDUCATION/TRAINING PROGRAM | Admitting: FAMILY MEDICINE
Payer: MEDICARE

## 2021-06-03 DIAGNOSIS — R55 SYNCOPE AND COLLAPSE: Primary | ICD-10-CM

## 2021-06-03 DIAGNOSIS — M79.606 PAIN OF LOWER EXTREMITY, UNSPECIFIED LATERALITY: ICD-10-CM

## 2021-06-03 LAB
ANION GAP SERPL CALC-SCNC: 5 MMOL/L (ref 5–15)
ATRIAL RATE: 107 BPM
BASOPHILS # BLD: 0.1 K/UL (ref 0–0.1)
BASOPHILS NFR BLD: 1 % (ref 0–1)
BNP SERPL-MCNC: 5040 PG/ML
BUN SERPL-MCNC: 20 MG/DL (ref 6–20)
BUN/CREAT SERPL: 19 (ref 12–20)
CA-I BLD-MCNC: 8.7 MG/DL (ref 8.5–10.1)
CALCULATED R AXIS, ECG10: 110 DEGREES
CALCULATED T AXIS, ECG11: -14 DEGREES
CHLORIDE SERPL-SCNC: 104 MMOL/L (ref 97–108)
CK SERPL-CCNC: 37 NG/ML (ref 39–308)
CO2 SERPL-SCNC: 27 MMOL/L (ref 21–32)
CREAT SERPL-MCNC: 1.03 MG/DL (ref 0.7–1.3)
D DIMER PPP FEU-MCNC: 1.56 UG/ML(FEU)
DIAGNOSIS, 93000: NORMAL
DIFFERENTIAL METHOD BLD: ABNORMAL
EOSINOPHIL # BLD: 0.1 K/UL (ref 0–0.4)
EOSINOPHIL NFR BLD: 1 % (ref 0–7)
ERYTHROCYTE [DISTWIDTH] IN BLOOD BY AUTOMATED COUNT: 13.9 % (ref 11.5–14.5)
GLUCOSE SERPL-MCNC: 108 MG/DL (ref 65–100)
HCT VFR BLD AUTO: 37.3 % (ref 36.6–50.3)
HGB BLD-MCNC: 11.8 G/DL (ref 12.1–17)
IMM GRANULOCYTES # BLD AUTO: 0 K/UL (ref 0–0.04)
IMM GRANULOCYTES NFR BLD AUTO: 0 % (ref 0–0.5)
LYMPHOCYTES # BLD: 2.2 K/UL (ref 0.8–3.5)
LYMPHOCYTES NFR BLD: 24 % (ref 12–49)
MCH RBC QN AUTO: 28.2 PG (ref 26–34)
MCHC RBC AUTO-ENTMCNC: 31.6 G/DL (ref 30–36.5)
MCV RBC AUTO: 89 FL (ref 80–99)
MONOCYTES # BLD: 0.8 K/UL (ref 0–1)
MONOCYTES NFR BLD: 9 % (ref 5–13)
NEUTS SEG # BLD: 6 K/UL (ref 1.8–8)
NEUTS SEG NFR BLD: 65 % (ref 32–75)
NRBC # BLD: 0 K/UL (ref 0–0.01)
NRBC BLD-RTO: 0 PER 100 WBC
PLATELET # BLD AUTO: 184 K/UL (ref 150–400)
PMV BLD AUTO: 11.2 FL (ref 8.9–12.9)
POTASSIUM SERPL-SCNC: 4.2 MMOL/L (ref 3.5–5.1)
Q-T INTERVAL, ECG07: 438 MS
QRS DURATION, ECG06: 180 MS
QTC CALCULATION (BEZET), ECG08: 541 MS
RBC # BLD AUTO: 4.19 M/UL (ref 4.1–5.7)
SODIUM SERPL-SCNC: 136 MMOL/L (ref 136–145)
TROPONIN I SERPL-MCNC: <0.05 NG/ML
VENTRICULAR RATE, ECG03: 92 BPM
WBC # BLD AUTO: 9.1 K/UL (ref 4.1–11.1)

## 2021-06-03 PROCEDURE — 96374 THER/PROPH/DIAG INJ IV PUSH: CPT

## 2021-06-03 PROCEDURE — 82550 ASSAY OF CK (CPK): CPT

## 2021-06-03 PROCEDURE — 83880 ASSAY OF NATRIURETIC PEPTIDE: CPT

## 2021-06-03 PROCEDURE — 99284 EMERGENCY DEPT VISIT MOD MDM: CPT

## 2021-06-03 PROCEDURE — 80048 BASIC METABOLIC PNL TOTAL CA: CPT

## 2021-06-03 PROCEDURE — 84484 ASSAY OF TROPONIN QUANT: CPT

## 2021-06-03 PROCEDURE — 74011250637 HC RX REV CODE- 250/637: Performed by: FAMILY MEDICINE

## 2021-06-03 PROCEDURE — 65270000029 HC RM PRIVATE

## 2021-06-03 PROCEDURE — 93005 ELECTROCARDIOGRAM TRACING: CPT

## 2021-06-03 PROCEDURE — 85379 FIBRIN DEGRADATION QUANT: CPT

## 2021-06-03 PROCEDURE — 71045 X-RAY EXAM CHEST 1 VIEW: CPT

## 2021-06-03 PROCEDURE — 93971 EXTREMITY STUDY: CPT

## 2021-06-03 PROCEDURE — 99222 1ST HOSP IP/OBS MODERATE 55: CPT | Performed by: SURGERY

## 2021-06-03 PROCEDURE — 74011250636 HC RX REV CODE- 250/636: Performed by: STUDENT IN AN ORGANIZED HEALTH CARE EDUCATION/TRAINING PROGRAM

## 2021-06-03 PROCEDURE — 74011000636 HC RX REV CODE- 636: Performed by: FAMILY MEDICINE

## 2021-06-03 PROCEDURE — 85025 COMPLETE CBC W/AUTO DIFF WBC: CPT

## 2021-06-03 PROCEDURE — 93926 LOWER EXTREMITY STUDY: CPT

## 2021-06-03 PROCEDURE — 75635 CT ANGIO ABDOMINAL ARTERIES: CPT

## 2021-06-03 PROCEDURE — 94660 CPAP INITIATION&MGMT: CPT

## 2021-06-03 PROCEDURE — 36415 COLL VENOUS BLD VENIPUNCTURE: CPT

## 2021-06-03 RX ORDER — TRAMADOL HYDROCHLORIDE 50 MG/1
50 TABLET ORAL
Status: DISCONTINUED | OUTPATIENT
Start: 2021-06-03 | End: 2021-06-04 | Stop reason: HOSPADM

## 2021-06-03 RX ORDER — ONDANSETRON 4 MG/1
4 TABLET, ORALLY DISINTEGRATING ORAL
Status: DISCONTINUED | OUTPATIENT
Start: 2021-06-03 | End: 2021-06-04 | Stop reason: HOSPADM

## 2021-06-03 RX ORDER — ATORVASTATIN CALCIUM 40 MG/1
80 TABLET, FILM COATED ORAL
Status: DISCONTINUED | OUTPATIENT
Start: 2021-06-03 | End: 2021-06-03

## 2021-06-03 RX ORDER — AMIODARONE HYDROCHLORIDE 200 MG/1
100 TABLET ORAL DAILY
Status: DISCONTINUED | OUTPATIENT
Start: 2021-06-04 | End: 2021-06-04 | Stop reason: HOSPADM

## 2021-06-03 RX ORDER — ONDANSETRON 2 MG/ML
4 INJECTION INTRAMUSCULAR; INTRAVENOUS
Status: DISCONTINUED | OUTPATIENT
Start: 2021-06-03 | End: 2021-06-04 | Stop reason: HOSPADM

## 2021-06-03 RX ORDER — ACETAMINOPHEN 650 MG/1
650 SUPPOSITORY RECTAL
Status: DISCONTINUED | OUTPATIENT
Start: 2021-06-03 | End: 2021-06-04 | Stop reason: HOSPADM

## 2021-06-03 RX ORDER — LISINOPRIL 20 MG/1
20 TABLET ORAL
Status: DISCONTINUED | OUTPATIENT
Start: 2021-06-03 | End: 2021-06-04 | Stop reason: HOSPADM

## 2021-06-03 RX ORDER — ACETAMINOPHEN 325 MG/1
650 TABLET ORAL
Status: DISCONTINUED | OUTPATIENT
Start: 2021-06-03 | End: 2021-06-04 | Stop reason: HOSPADM

## 2021-06-03 RX ORDER — FENTANYL CITRATE 50 UG/ML
25 INJECTION, SOLUTION INTRAMUSCULAR; INTRAVENOUS
Status: COMPLETED | OUTPATIENT
Start: 2021-06-03 | End: 2021-06-03

## 2021-06-03 RX ORDER — POLYETHYLENE GLYCOL 3350 17 G/17G
17 POWDER, FOR SOLUTION ORAL DAILY PRN
Status: DISCONTINUED | OUTPATIENT
Start: 2021-06-03 | End: 2021-06-04 | Stop reason: HOSPADM

## 2021-06-03 RX ORDER — ATORVASTATIN CALCIUM 40 MG/1
80 TABLET, FILM COATED ORAL
Status: DISCONTINUED | OUTPATIENT
Start: 2021-06-03 | End: 2021-06-04 | Stop reason: HOSPADM

## 2021-06-03 RX ADMIN — LISINOPRIL 20 MG: 20 TABLET ORAL at 20:53

## 2021-06-03 RX ADMIN — ATORVASTATIN CALCIUM 80 MG: 40 TABLET, FILM COATED ORAL at 21:29

## 2021-06-03 RX ADMIN — IOPAMIDOL 100 ML: 755 INJECTION, SOLUTION INTRAVENOUS at 20:23

## 2021-06-03 RX ADMIN — FENTANYL CITRATE 25 MCG: 50 INJECTION, SOLUTION INTRAMUSCULAR; INTRAVENOUS at 15:02

## 2021-06-03 RX ADMIN — ACETAMINOPHEN 650 MG: 325 TABLET ORAL at 20:45

## 2021-06-03 RX ADMIN — TRAMADOL HYDROCHLORIDE 50 MG: 50 TABLET, FILM COATED ORAL at 19:48

## 2021-06-03 NOTE — H&P
History and Physical    NAME: Josue Mobley   :  1935   MRN:  [id number]     Date/Time:  6/3/2021 6:48 PM    Patient PCP: Mai Mejia MD  ______________________________________________________________________             Subjective:     CHIEF COMPLAINT:     Syncopal episode    HISTORY OF PRESENT ILLNESS:       Patient is a 81y.o. year old male with signal past medical history of CAD status post CABG x6 history of stenting x2 history of aortic stenosis hypertension chronic A. fib history was CVA with right-sided residual weakness but able to walk patient have a cardiac cath done 1 week ago patient was scheduled for cardiac surgery appointment for aortic stenosis repair patient was at home and have syncopal episode completely passed out, patient came to the ER seen by the ER physician    Chest x-ray done shows possible hydrostatic edema    Patient have a swelling of the right groin area at the cardiac cath site  Patient was seen 1. right groin bilobular pseudoaneurysm (1.9cm and 1.8cm) adjacent to the superficial femoral artery with no identifiable neck. 2. Hematoma measuring approx 9.3cm x 5.4cm adjacent to the pseudoaneurysm. 3. 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 Venous flow is pulsatile and retrograde within the common femoral, femoral, and great saphenous veins.     Patient was seen with vascular surgeon recommend to have CT of the right leg    As patient have history of severe aortic stenosis he want to talk to the cardiology tomorrow for further treatment plan    Past Medical History:   Diagnosis Date    Arrhythmia 2012    PAROX AFIB    Arthritis     CAD (coronary artery disease)     Cancer ([facility] [address]     BLADDER    Hypertension     Sleep apnea     NO CPAP    Stroke ([facility] [address]     NO RESIDUAL        Past Surgical History:   Procedure Laterality Date    HX CORONARY ARTERY BYPASS GRAFT  2004    HX ORTHOPAEDIC Right     REVERSE SHOULDER REPLACEMENT    HX OTHER SURGICAL  2015    BOTTOM LIP SQUAMOUS CELL REMOVED    HX PACEMAKER PLACEMENT  04/2018    NEUROLOGICAL PROCEDURE UNLISTED  1994    LUMBAR SX    IN CARDIAC SURG PROCEDURE UNLIST  2005    CARDIAC STENT       Social History     Tobacco Use    Smoking status: Never Smoker    Smokeless tobacco: Never Used   Substance Use Topics    Alcohol use: No     Alcohol/week: 0.0 standard drinks        Family History   Family history unknown: Yes       Allergies   Allergen Reactions    Oxycodone Other (comments)     VOMITTING        Prior to Admission medications    Medication Sig Start Date End Date Taking? Authorizing Provider   amiodarone (CORDARONE) 200 mg tablet Take 100 mg by mouth daily. Yes Provider, Historical   omega-3 fatty acids-vitamin e 1,000 mg cap Take 1 Cap by mouth. Yes Provider, Historical   ramipril (ALTACE) 5 mg capsule Take  by mouth daily (with dinner). Yes Provider, Historical   rosuvastatin (CRESTOR) 40 mg tablet Take 40 mg by mouth nightly. Yes Provider, Historical   rivaroxaban (XARELTO) 20 mg tab tablet Take  by mouth daily (with dinner). Yes Provider, Historical         Current Facility-Administered Medications:     [START ON 6/4/2021] amiodarone (CORDARONE) tablet 100 mg, 100 mg, Oral, DAILY, Louis Romero MD    . PHARMACY TO SUBSTITUTE PER PROTOCOL (Reordered from: omega-3 fatty acids-vitamin e 1,000 mg cap), , , Per Protocol, Louis Romero MD    ramipriL (ALTACE) capsule 5 mg, 5 mg, Oral, DAILY WITH DINNER, Louis Romero MD    rosuvastatin (CRESTOR) tablet 40 mg, 40 mg, Oral, QHS, Rajendra Romero MD    acetaminophen (TYLENOL) tablet 650 mg, 650 mg, Oral, Q6H PRN **OR** acetaminophen (TYLENOL) suppository 650 mg, 650 mg, Rectal, Q6H PRN, Louis Romero MD    polyethylene glycol (MIRALAX) packet 17 g, 17 g, Oral, DAILY PRN, Rajendra Romero MD    ondansetron (ZOFRAN ODT) tablet 4 mg, 4 mg, Oral, Q8H PRN **OR** ondansetron (ZOFRAN) injection 4 mg, 4 mg, IntraVENous, Q6H PRN, Rajendra Romero MD    LAB DATA REVIEWED:    Recent Results (from the past 24 hour(s))   EKG, 12 LEAD, INITIAL    Collection Time: 06/03/21  1:37 PM   Result Value Ref Range    Ventricular Rate 92 BPM    Atrial Rate 107 BPM    QRS Duration 180 ms    Q-T Interval 438 ms    QTC Calculation (Bezet) 541 ms    Calculated R Axis 110 degrees    Calculated T Axis -14 degrees    Diagnosis       Atrial fibrillation  Non-specific intra-ventricular conduction block  Abnormal ECG  No previous ECGs available  [id number] on 6/3/2021 4:26:06 PM     CBC WITH AUTOMATED DIFF    Collection Time: 06/03/21  1:40 PM   Result Value Ref Range    WBC 9.1 4.1 - 11.1 K/uL    RBC 4.19 4. 10 - 5.70 M/uL    HGB 11.8 (L) 12.1 - 17.0 g/dL    HCT 37.3 36.6 - 50.3 %    MCV 89.0 80.0 - 99.0 FL    MCH 28.2 26.0 - 34.0 PG    MCHC 31.6 30.0 - 36.5 g/dL    RDW 13.9 11.5 - 14.5 %    PLATELET 973 025 - 593 K/uL    MPV 11.2 8.9 - 12.9 FL    NRBC 0.0 0.0  WBC    ABSOLUTE NRBC 0.00 0.00 - 0.01 K/uL    NEUTROPHILS 65 32 - 75 %    LYMPHOCYTES 24 12 - 49 %    MONOCYTES 9 5 - 13 %    EOSINOPHILS 1 0 - 7 %    BASOPHILS 1 0 - 1 %    IMMATURE GRANULOCYTES 0 0 - 0.5 %    ABS. NEUTROPHILS 6.0 1.8 - 8.0 K/UL    ABS. LYMPHOCYTES 2.2 0.8 - 3.5 K/UL    ABS. MONOCYTES 0.8 0.0 - 1.0 K/UL    ABS. EOSINOPHILS 0.1 0.0 - 0.4 K/UL    ABS. BASOPHILS 0.1 0.0 - 0.1 K/UL    ABS. IMM.  GRANS. 0.0 0.00 - 0.04 K/UL    DF AUTOMATED     METABOLIC PANEL, BASIC    Collection Time: 06/03/21  1:40 PM   Result Value Ref Range    Sodium 136 136 - 145 mmol/L    Potassium 4.2 3.5 - 5.1 mmol/L    Chloride 104 97 - 108 mmol/L    CO2 27 21 - 32 mmol/L    Anion gap 5 5 - 15 mmol/L    Glucose 108 (H) 65 - 100 mg/dL    BUN 20 6 - 20 mg/dL    Creatinine 1.03 0.70 - 1.30 mg/dL    BUN/Creatinine ratio 19 12 - 20      GFR est AA >60 >60 ml/min/1.73m2    GFR est non-AA >60 >60 ml/min/1.73m2    Calcium 8.7 8.5 - 10.1 mg/dL   BNP    Collection Time: 06/03/21  1:40 PM   Result Value Ref Range    NT pro-BNP 5,040 (H) <450 pg/mL   TROPONIN I    Collection Time: 06/03/21  1:40 PM   Result Value Ref Range    Troponin-I, Qt. <0.05 <0.05 ng/mL   D DIMER    Collection Time: 06/03/21  1:40 PM   Result Value Ref Range    D DIMER 1.56 (H) <0.50 ug/ml(FEU)   CK W/ REFLX CKMB    Collection Time: 06/03/21  1:40 PM   Result Value Ref Range    CK 37.0 (L) 39 - 308 ng/mL   DUPLEX LOWER EXT ARTERY RIGHT    Collection Time: 06/03/21  4:47 PM   Result Value Ref Range    Right CFA prox sys .0 cm/s    Right super femoral prox sys PSV 71.6 cm/s    Right SFA Prox Yayo Ratio 0.6        XR Results (most recent):  Results from Hospital Encounter encounter on 06/03/21    XR CHEST PORT    Narrative  Chest, frontal view, 6/3/2021    History: Bradycardia. Comparison: None. Findings: The patient is status post median sternotomy. Left sided pacemaker  device with 2 leads in place. The cardiac silhouette is enlarged. The lungs  are underexpanded. Hazy opacities at the bases suggest atelectasis and pleural  effusions. Hydrostatic edema is possible. No pneumothorax is identified. Degenerative changes are seen in the thoracic spine and left shoulder. A right  humeral prosthesis incompletely imaged. Impression  Bibasilar atelectasis and pleural effusions. Possible hydrostatic  edema. XR CHEST PORT   Final Result   Bibasilar atelectasis and pleural effusions. Possible hydrostatic   edema. CTA LOW EXT RT W CONT    (Results Pending)        Review of Systems:  Constitutional: Negative for chills and fever. HENT: Negative. Eyes: Negative. Respiratory: Negative. Cardiovascular: Negative. Gastrointestinal: Negative for abdominal pain and nausea. Skin: Negative. Neurological: Negative.       Objective:   VITALS:    Visit Vitals  BP (!) 142/87   Pulse 98   Temp 97.6 °F (36.4 °C)   Resp 18   SpO2 100%       Physical Exam:   Constitutional: pt is oriented to person, place, and time. HENT:   Head: Normocephalic and atraumatic. Eyes: Pupils are equal, round, and reactive to light. EOM are normal.   Cardiovascular: Normal rate, regular rhythm and normal heart sounds. Pulmonary/Chest: Breath sounds normal. No wheezes. No rales. Exhibits no tenderness. Abdominal: Soft. Bowel sounds are normal. There is no abdominal tenderness. There is no rebound and no guarding. Musculoskeletal: Normal range of motion. Neurological: pt is alert and oriented to person, place, and time. Alert. Normal strength. No cranial nerve deficit or sensory deficit. Displays a negative Romberg sign. 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  Fib  CAD status post CABG and stenting  History of CVA  Hypertension      Discontinue Xarelto  Vascular surgical and cardiology consult  Order CT of the right leg    Order some tramadol 50 mg every 6 hours for pain    Repeat the labs monitor H&H    Detailed discussion with patient and patient wife possible transfer tomorrow o [facility]    ________________________________________________________________________    Signed: Josr Zambrano MD

## 2021-06-03 NOTE — PROGRESS NOTES
Primary Nurse Marlene South RN and Jordan Black RN performed a dual skin assessment on this patient Impairment noted are ecchymosis on upper right thigh and groin.

## 2021-06-03 NOTE — ED TRIAGE NOTES
Pt is alert and oriented coming from home, sent for syncope episodes, bradycardic for ems, had heart cath done weds, released on Thursday from hospital. Ems gave pt 300ml fluids, lethargic, vital stabilized at this time.

## 2021-06-03 NOTE — ED PROVIDER NOTES
EMERGENCY DEPARTMENT HISTORY AND PHYSICAL EXAM      Date: 6/3/2021  Patient Name: Radha Barbosa    History of Presenting Illness     Chief Complaint   Patient presents with    Dizziness    Slow Heart Rate       History Provided By: Patient    HPI: Radha Barbosa, 81 y.o. male with a past medical history significant diabetes, hypertension and CAD, recent catheterization presents to the ED with cc of near syncope, dizziness. Patient had catheterization completed 2 days ago, was released yesterday, states he was working on his car when suddenly felt weak and dizzy, according to patient's wife patient lost consciousness for several seconds. EMS was called, on EMS arrival patient bradycardic in the 40s, hypotensive. Patient received 350 mils normal saline with improvement of symptoms. Currently patient denies any chest pain palpitations no shortness of breath. Patient's main complaint is pain and swelling to his right groin area. There are no other complaints, changes, or physical findings at this time. PCP: Olaf Monroy MD    Current Outpatient Medications   Medication Sig Dispense Refill    amiodarone (CORDARONE) 200 mg tablet Take 100 mg by mouth daily.  omega-3 fatty acids-vitamin e 1,000 mg cap Take 1 Cap by mouth.  ramipril (ALTACE) 5 mg capsule Take  by mouth daily (with dinner).  rosuvastatin (CRESTOR) 40 mg tablet Take 40 mg by mouth nightly.  rivaroxaban (XARELTO) 20 mg tab tablet Take  by mouth daily (with dinner).          Past History     Past Medical History:  Past Medical History:   Diagnosis Date    Arrhythmia 2012    PAROX AFIB    Arthritis     CAD (coronary artery disease)     Cancer ([facility] [address] 2000    BLADDER    Hypertension     Sleep apnea     NO CPAP    Stroke ([facility] [address] 2012    NO RESIDUAL       Past Surgical History:  Past Surgical History:   Procedure Laterality Date    HX CORONARY ARTERY BYPASS GRAFT  2004    HX ORTHOPAEDIC Right     REVERSE SHOULDER REPLACEMENT    HX OTHER SURGICAL  2015    BOTTOM LIP SQUAMOUS CELL REMOVED    HX PACEMAKER PLACEMENT  04/2018    NEUROLOGICAL PROCEDURE UNLISTED  1994    LUMBAR SX    [place] CARDIAC SURG PROCEDURE UNLIST  2005    CARDIAC STENT       Family History:  Family History   Family history unknown: Yes       Social History:  Social History     Tobacco Use    Smoking status: Never Smoker    Smokeless tobacco: Never Used   Substance Use Topics    Alcohol use: No     Alcohol/week: 0.0 standard drinks    Drug use: No       Allergies: Allergies   Allergen Reactions    Oxycodone Other (comments)     VOMITTING         Review of Systems     Review of Systems   Constitutional: Negative for activity change, appetite change, chills and fever. HENT: Negative for congestion, sore throat and trouble swallowing. Eyes: Negative for photophobia and visual disturbance. Respiratory: Negative for cough, chest tightness and shortness of breath. Cardiovascular: Positive for palpitations and leg swelling. Negative for chest pain. Gastrointestinal: Negative for abdominal pain and nausea. Genitourinary: Negative for dysuria and flank pain. Musculoskeletal: Negative for arthralgias and neck pain. Skin: Negative for color change and pallor. Neurological: Positive for syncope and light-headedness. Negative for dizziness, weakness, numbness and headaches. Physical Exam     Physical Exam  Vitals and nursing note reviewed. Constitutional:       Appearance: Normal appearance. He is normal weight. HENT:      Head: Normocephalic and atraumatic. Nose: Nose normal.      Mouth/Throat:      Mouth: Mucous membranes are moist.   Eyes:      Extraocular Movements: Extraocular movements intact. Pupils: Pupils are equal, round, and reactive to light. Cardiovascular:      Rate and Rhythm: Normal rate and regular rhythm. Pulses: Normal pulses.            Dorsalis pedis pulses are 2+ on the right side and 2+ on the left side.      Heart sounds: Normal heart sounds. Pulmonary:      Breath sounds: Normal breath sounds. Abdominal:      General: Abdomen is flat. Bowel sounds are normal.      Palpations: Abdomen is soft. Musculoskeletal:         General: No swelling or tenderness. Normal range of motion. Cervical back: Normal range of motion and neck supple. Legs:    Skin:     General: Skin is warm and dry. Capillary Refill: Capillary refill takes less than 2 seconds. Neurological:      General: No focal deficit present. Mental Status: He is alert and oriented to person, place, and time. Cranial Nerves: No cranial nerve deficit. Sensory: No sensory deficit. Motor: No weakness. Psychiatric:         Mood and Affect: Mood normal.         Behavior: Behavior normal.         Diagnostic Study Results     Labs -     Recent Results (from the past 12 hour(s))   CBC WITH AUTOMATED DIFF    Collection Time: 06/03/21  1:40 PM   Result Value Ref Range    WBC 9.1 4.1 - 11.1 K/uL    RBC 4.19 4. 10 - 5.70 M/uL    HGB 11.8 (L) 12.1 - 17.0 g/dL    HCT 37.3 36.6 - 50.3 %    MCV 89.0 80.0 - 99.0 FL    MCH 28.2 26.0 - 34.0 PG    MCHC 31.6 30.0 - 36.5 g/dL    RDW 13.9 11.5 - 14.5 %    PLATELET 131 989 - 500 K/uL    MPV 11.2 8.9 - 12.9 FL    NRBC 0.0 0.0  WBC    ABSOLUTE NRBC 0.00 0.00 - 0.01 K/uL    NEUTROPHILS 65 32 - 75 %    LYMPHOCYTES 24 12 - 49 %    MONOCYTES 9 5 - 13 %    EOSINOPHILS 1 0 - 7 %    BASOPHILS 1 0 - 1 %    IMMATURE GRANULOCYTES 0 0 - 0.5 %    ABS. NEUTROPHILS 6.0 1.8 - 8.0 K/UL    ABS. LYMPHOCYTES 2.2 0.8 - 3.5 K/UL    ABS. MONOCYTES 0.8 0.0 - 1.0 K/UL    ABS. EOSINOPHILS 0.1 0.0 - 0.4 K/UL    ABS. BASOPHILS 0.1 0.0 - 0.1 K/UL    ABS. IMM.  GRANS. 0.0 0.00 - 0.04 K/UL    DF AUTOMATED     METABOLIC PANEL, BASIC    Collection Time: 06/03/21  1:40 PM   Result Value Ref Range    Sodium 136 136 - 145 mmol/L    Potassium 4.2 3.5 - 5.1 mmol/L    Chloride 104 97 - 108 mmol/L    CO2 27 21 - 32 mmol/L    Anion gap 5 5 - 15 mmol/L    Glucose 108 (H) 65 - 100 mg/dL    BUN 20 6 - 20 mg/dL    Creatinine 1.03 0.70 - 1.30 mg/dL    BUN/Creatinine ratio 19 12 - 20      GFR est AA >60 >60 ml/min/1.73m2    GFR est non-AA >60 >60 ml/min/1.73m2    Calcium 8.7 8.5 - 10.1 mg/dL   BNP    Collection Time: 06/03/21  1:40 PM   Result Value Ref Range    NT pro-BNP 5,040 (H) <450 pg/mL   TROPONIN I    Collection Time: 06/03/21  1:40 PM   Result Value Ref Range    Troponin-I, Qt. <0.05 <0.05 ng/mL   D DIMER    Collection Time: 06/03/21  1:40 PM   Result Value Ref Range    D DIMER 1.56 (H) <0.50 ug/ml(FEU)   CK W/ REFLX CKMB    Collection Time: 06/03/21  1:40 PM   Result Value Ref Range    CK 37.0 (L) 39 - 308 ng/mL       Radiologic Studies -   [unfilled]  CT Results  (Last 48 hours)    None        CXR Results  (Last 48 hours)               06/03/21 1435  XR CHEST PORT Final result    Impression:  Bibasilar atelectasis and pleural effusions. Possible hydrostatic   edema. Narrative:  Chest, frontal view, 6/3/2021       History: Bradycardia. Comparison: None. Findings: The patient is status post median sternotomy. Left sided pacemaker   device with 2 leads in place. The cardiac silhouette is enlarged. The lungs   are underexpanded. Hazy opacities at the bases suggest atelectasis and pleural   effusions. Hydrostatic edema is possible. No pneumothorax is identified. Degenerative changes are seen in the thoracic spine and left shoulder. A right   humeral prosthesis incompletely imaged. Medical Decision Making and ED Course   I am the first provider for this patient. I reviewed the vital signs, available nursing notes, past medical history, past surgical history, family history and social history. Vital Signs-Reviewed the patient's vital signs.   Patient Vitals for the past 12 hrs:   Temp Pulse Resp BP SpO2   06/03/21 1453 -- 94 20 (!) 139/97 100 %   06/03/21 1342 -- -- -- -- 100 %   06/03/21 1340 97.4 °F (36.3 °C) 99 20 114/83 99 %       EKG interpretation: (Preliminary)  Paroxysmal A. fib 92, no ST elevations, normal axis, nonspecific T wave changes in lateral leads, interventricular block    Records Reviewed: Nursing Notes    The patient presents with weakness dizziness near syncope with a differential diagnosis of cardiac arrhythmia, vasovagal hypotension, ACS, dehydration      Provider Notes (Medical Decision Making):     MDM   81-year-old male, history of CAD, hypertension, recent catheterization, presents to emergency department for episode of weakness dizziness, near syncope. EMS noted hypotension bradycardia on arrival, received small bolus of fluids with improvement of symptoms. Patient awake alert oriented denies any chest pain palpitations, stable vitals pulse 99 blood pressure 114/83. Only complaint is pain to right groin area    Exam shows large hard ecchymotic mass to right groin, difficult to palpate femoral pulse over hematoma however patient has equal bounding DP pulses bilateral.    We will order basic lab work including cardiac enzymes, analgesia continue to monitor patient. ED Course:   Initial assessment performed. The patients presenting problems have been discussed, and they are in agreement with the care plan formulated and outlined with them. I have encouraged them to ask questions as they arise throughout their visit. Patient's lab work reviewed, troponin negative, stable hemoglobin, elevation of BNP, chest x-ray unremarkable. Given recent cardiac catheterization and syncopal episode will admit patient. ED Course as of Jun 03 1524   Thu Jun 03, 2021   1443 Discussed case with Dr. Tania Garrison, will admit patient for syncope. [PZ]      ED Course User Index  [PZ] Claudene Reveal, MD         Procedures       Ruth Cary MD  Procedures                     Disposition     Admit patient       Diagnosis     Clinical Impression:   1.  Syncope and collapse        Attestations:    Elsi Daily MD    Please note that this dictation was completed with [employer], the computer voice recognition software. Quite often unanticipated grammatical, syntax, homophones, and other interpretive errors are inadvertently transcribed by the computer software. Please disregard these errors. Please excuse any errors that have escaped final proofreading. Thank you.

## 2021-06-04 VITALS
OXYGEN SATURATION: 98 % | DIASTOLIC BLOOD PRESSURE: 70 MMHG | SYSTOLIC BLOOD PRESSURE: 123 MMHG | TEMPERATURE: 97.8 F | HEART RATE: 90 BPM | RESPIRATION RATE: 18 BRPM

## 2021-06-04 LAB
ALBUMIN SERPL-MCNC: 2.7 G/DL (ref 3.5–5)
ALBUMIN/GLOB SERPL: 0.9 {RATIO} (ref 1.1–2.2)
ALP SERPL-CCNC: 81 U/L (ref 45–117)
ALT SERPL-CCNC: 13 U/L (ref 12–78)
ANION GAP SERPL CALC-SCNC: 5 MMOL/L (ref 5–15)
AST SERPL W P-5'-P-CCNC: 8 U/L (ref 15–37)
BASOPHILS # BLD: 0 K/UL (ref 0–0.1)
BASOPHILS NFR BLD: 1 % (ref 0–1)
BILIRUB SERPL-MCNC: 0.8 MG/DL (ref 0.2–1)
BUN SERPL-MCNC: 14 MG/DL (ref 6–20)
BUN/CREAT SERPL: 18 (ref 12–20)
CA-I BLD-MCNC: 8.8 MG/DL (ref 8.5–10.1)
CHLORIDE SERPL-SCNC: 103 MMOL/L (ref 97–108)
CO2 SERPL-SCNC: 26 MMOL/L (ref 21–32)
CREAT SERPL-MCNC: 0.79 MG/DL (ref 0.7–1.3)
DIFFERENTIAL METHOD BLD: ABNORMAL
EOSINOPHIL # BLD: 0.1 K/UL (ref 0–0.4)
EOSINOPHIL NFR BLD: 1 % (ref 0–7)
ERYTHROCYTE [DISTWIDTH] IN BLOOD BY AUTOMATED COUNT: 14 % (ref 11.5–14.5)
GLOBULIN SER CALC-MCNC: 3 G/DL (ref 2–4)
GLUCOSE SERPL-MCNC: 107 MG/DL (ref 65–100)
HCT VFR BLD AUTO: 30.9 % (ref 36.6–50.3)
HGB BLD-MCNC: 10 G/DL (ref 12.1–17)
IMM GRANULOCYTES # BLD AUTO: 0 K/UL (ref 0–0.04)
IMM GRANULOCYTES NFR BLD AUTO: 0 % (ref 0–0.5)
LYMPHOCYTES # BLD: 1.2 K/UL (ref 0.8–3.5)
LYMPHOCYTES NFR BLD: 17 % (ref 12–49)
MCH RBC QN AUTO: 28.4 PG (ref 26–34)
MCHC RBC AUTO-ENTMCNC: 32.4 G/DL (ref 30–36.5)
MCV RBC AUTO: 87.8 FL (ref 80–99)
MONOCYTES # BLD: 0.6 K/UL (ref 0–1)
MONOCYTES NFR BLD: 9 % (ref 5–13)
NEUTS SEG # BLD: 5.3 K/UL (ref 1.8–8)
NEUTS SEG NFR BLD: 72 % (ref 32–75)
NRBC # BLD: 0 K/UL (ref 0–0.01)
NRBC BLD-RTO: 0 PER 100 WBC
PLATELET # BLD AUTO: 160 K/UL (ref 150–400)
PMV BLD AUTO: 11.4 FL (ref 8.9–12.9)
POTASSIUM SERPL-SCNC: 4.1 MMOL/L (ref 3.5–5.1)
PROT SERPL-MCNC: 5.7 G/DL (ref 6.4–8.2)
RBC # BLD AUTO: 3.52 M/UL (ref 4.1–5.7)
RIGHT CFA PROX SYS PSV: 129 CM/S
RIGHT SFA PROX VEL RATIO: 0.6
RIGHT SUPER FEMORAL PROX SYS PSV: 71.6 CM/S
SODIUM SERPL-SCNC: 134 MMOL/L (ref 136–145)
WBC # BLD AUTO: 7.2 K/UL (ref 4.1–11.1)

## 2021-06-04 PROCEDURE — 85025 COMPLETE CBC W/AUTO DIFF WBC: CPT

## 2021-06-04 PROCEDURE — 93971 EXTREMITY STUDY: CPT | Performed by: SURGERY

## 2021-06-04 PROCEDURE — 74011250637 HC RX REV CODE- 250/637: Performed by: FAMILY MEDICINE

## 2021-06-04 PROCEDURE — 80053 COMPREHEN METABOLIC PANEL: CPT

## 2021-06-04 PROCEDURE — 93926 LOWER EXTREMITY STUDY: CPT | Performed by: SURGERY

## 2021-06-04 PROCEDURE — 36415 COLL VENOUS BLD VENIPUNCTURE: CPT

## 2021-06-04 RX ADMIN — AMIODARONE HYDROCHLORIDE 100 MG: 200 TABLET ORAL at 08:56

## 2021-06-04 NOTE — CONSULTS
16-year-old patient with significant cardiac history with multiple cardiac bypasses, prior history of stroke and heart attack as well as severe aortic stenosis being worked up for endovascular repair of aortic stenosis in [address] [facility] presented yesterday for acute onset of right lower extremity swelling and presyncopal symptoms. A dedicated duplex ultrasound of the of the right groin showed a large 9.3 x 5.4 cm right groin hematoma compressing the right common femoral vein. 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 There is also note of a AV fistula with complete retrograde flow and pulsatility in the adjacent greater saphenous vein as well as the common femoral vein. A follow-up CTA angiogram confirmed the large hematoma in the right groin compression of the common femoral vein as well as the complex multilobed pseudoaneurysm measuring up to 3.4 x 2.6 cm. A definite/clear communication with the common femoral artery is difficult to ascertain however, there is a point where there is a age subtle communication/neck at series 46 image 136 at the 12:00 aspect of the common femoral artery. I saw on exam the patient this morning. There is significant swelling and ecchymosis involving the right thigh. The right lower extremity is swollen. Patient denies numbness or tingling. Recommendations:  After examination of the duplex ultrasound, I am concerned in regards to the to high flow nature of the AV fistula of up to 50 cm/s. Also the significant mass effect from the hematoma itself on the common femoral vein.  Overall, thrombin injection would be high risk, low yield (greater than 60% of a fistulous post thrombin injection have been shown to continue to remain patent) and thrombin injection would not address the large hematoma compressing the common femoral vein contributing to venous stasis. Patient would most benefit from definitive intervention by way of surgical repair and evacuation of hematoma. In discussion with patient, it appears that he is scheduled to receive a endovascular aortic valve replacement for severe aortic stenosis at [facility]. I discussed the case in depth with Dr. Zac Thomason, consider transfer to [facility] where pt may have concomitant endovascular aortic valve replacement, repair of pseudoaneurysm/AV fistula as well as evacuation of the right groin hematoma. Visit Vitals  /62 (BP 1 Location: Left upper arm, BP Patient Position: At rest;Supine)   Pulse 94   Temp 98 °F (36.7 °C)   Resp 18   SpO2 97%       Past Medical History:   Diagnosis Date    Arrhythmia 2012    PAROX AFIB    Arthritis     CAD (coronary artery disease)     Cancer ([facility] [address] 2000    BLADDER    Hypertension     Sleep apnea     NO CPAP    [facility]    NO RESIDUAL       Recent Results (from the past 12 hour(s))   CBC WITH AUTOMATED DIFF    Collection Time: 06/04/21  8:00 AM   Result Value Ref Range    WBC 7.2 4.1 - 11.1 K/uL    RBC 3.52 (L) 4.10 - 5.70 M/uL    HGB 10.0 (L) 12.1 - 17.0 g/dL    HCT 30.9 (L) 36.6 - 50.3 %    MCV 87.8 80.0 - 99.0 FL    MCH 28.4 26.0 - 34.0 PG    MCHC 32.4 30.0 - 36.5 g/dL    RDW 14.0 11.5 - 14.5 %    PLATELET 187 600 - 085 K/uL    MPV 11.4 8.9 - 12.9 FL    NRBC 0.0 0.0  WBC    ABSOLUTE NRBC 0.00 0.00 - 0.01 K/uL    NEUTROPHILS 72 32 - 75 %    LYMPHOCYTES 17 12 - 49 %    MONOCYTES 9 5 - 13 %    EOSINOPHILS 1 0 - 7 %    BASOPHILS 1 0 - 1 %    IMMATURE GRANULOCYTES 0 0 - 0.5 %    ABS. NEUTROPHILS 5.3 1.8 - 8.0 K/UL    ABS. LYMPHOCYTES 1.2 0.8 - 3.5 K/UL    ABS. MONOCYTES 0.6 0.0 - 1.0 K/UL    ABS. EOSINOPHILS 0.1 0.0 - 0.4 K/UL    ABS. BASOPHILS 0.0 0.0 - 0.1 K/UL    ABS. IMM.  GRANS. 0.0 0.00 - 0.04 K/UL    DF AUTOMATED     METABOLIC PANEL, COMPREHENSIVE    Collection Time: 06/04/21  8:00 AM   Result Value Ref Range    Sodium 134 (L) 136 - 145 mmol/L    Potassium 4.1 3.5 - 5.1 mmol/L    Chloride 103 97 - 108 mmol/L    CO2 26 21 - 32 mmol/L    Anion gap 5 5 - 15 mmol/L    Glucose 107 (H) 65 - 100 mg/dL    BUN 14 6 - 20 mg/dL    Creatinine 0.79 0.70 - 1.30 mg/dL    BUN/Creatinine ratio 18 12 - 20      GFR est AA >60 >60 ml/min/1.73m2    GFR est non-AA >60 >60 ml/min/1.73m2    Calcium 8.8 8.5 - 10.1 mg/dL    Bilirubin, total 0.8 0.2 - 1.0 mg/dL    AST (SGOT) 8 (L) 15 - 37 U/L    ALT (SGPT) 13 12 - 78 U/L    Alk. phosphatase 81 45 - 117 U/L    Protein, total 5.7 (L) 6.4 - 8.2 g/dL    Albumin 2.7 (L) 3.5 - 5.0 g/dL    Globulin 3.0 2.0 - 4.0 g/dL    A-G Ratio 0.9 (L) 1.1 - 2.2         No current facility-administered medications on file prior to encounter. Current Outpatient Medications on File Prior to Encounter   Medication Sig Dispense Refill    amiodarone (CORDARONE) 200 mg tablet Take 100 mg by mouth daily.  omega-3 fatty acids-vitamin e 1,000 mg cap Take 1 Cap by mouth.  ramipril (ALTACE) 5 mg capsule Take  by mouth daily (with dinner).  rosuvastatin (CRESTOR) 40 mg tablet Take 40 mg by mouth nightly.  rivaroxaban (XARELTO) 20 mg tab tablet Take  by mouth daily (with dinner).

## 2021-06-04 NOTE — CONSULTS
History and Physical    Chief complaints: Right groin pain and swelling. History of Presenting Illness:  Brandy Fish is a 81 y.o. pleasant man admitted to hospital because of the right groin pain and swelling. Patient has a catheterization through the right groin about a week ago for coronary angiogram.  Patient has known history of severe aortic stenosis which is asymptomatic with a dizzy spell. Patient apparently anticipate for intervention at some near future. Anyways I was consulted to evaluate right groin pain and swelling. Patient has a duplex ultrasound done which shows common femoral vein and artery AV fistula, pseudoaneurysm and large hematoma. Patient is describing pain is sharp and constant 10 out of 10 no other relieving factor associated with difficulty moving right leg. And pain is localized. Patient denies fever chills chest pain shortness of breath. Past Medical History:   Diagnosis Date    Arrhythmia 2012    PAROX AFIB    Arthritis     CAD (coronary artery disease)     Cancer ([facility] [address] 2000    BLADDER    Hypertension     Sleep apnea     NO CPAP    Stroke ([facility] [address] 2012    NO RESIDUAL      Past Surgical History:   Procedure Laterality Date    HX CORONARY ARTERY BYPASS GRAFT  2004    HX ORTHOPAEDIC Right     REVERSE SHOULDER REPLACEMENT    HX OTHER SURGICAL  2015    BOTTOM LIP SQUAMOUS CELL REMOVED    HX PACEMAKER PLACEMENT  04/2018    NEUROLOGICAL PROCEDURE UNLISTED  1994    LUMBAR SX    [place] CARDIAC SURG PROCEDURE UNLIST  2005    CARDIAC STENT     Family History   Family history unknown: Yes      Social History     Tobacco Use    Smoking status: Never Smoker    Smokeless tobacco: Never Used   Substance Use Topics    Alcohol use: No     Alcohol/week: 0.0 standard drinks       Prior to Admission medications    Medication Sig Start Date End Date Taking? Authorizing Provider   amiodarone (CORDARONE) 200 mg tablet Take 100 mg by mouth daily.    Yes Provider, Historical omega-3 fatty acids-vitamin e 1,000 mg cap Take 1 Cap by mouth. Yes Provider, Historical   ramipril (ALTACE) 5 mg capsule Take  by mouth daily (with dinner). Yes Provider, Historical   rosuvastatin (CRESTOR) 40 mg tablet Take 40 mg by mouth nightly. Yes Provider, Historical   rivaroxaban (XARELTO) 20 mg tab tablet Take  by mouth daily (with dinner). Yes Provider, Historical     Allergies   Allergen Reactions    Oxycodone Other (comments)     VOMITTING        Review of Systems:  Pertinent review of systems discussed in HPI, and rest of organ systems personally reviewed and they are negative. Objective:   Vital signs reviewed:      Visit Vitals  /70 (BP 1 Location: Left upper arm, BP Patient Position: At rest)   Pulse 97   Temp 98.6 °F (37 °C)   Resp 18   SpO2 98%       Physical Exam:   General appearance:   Patient is awake and alert  Head and neck atraumatic  ENT oral mucosa is normal there is no hoarse voice no jaundice  Eyes pupil equal gaze appropriate  Cardiovascular system is regular rate  Pulmonary no audible wheeze  Chest wall is normal excursion with respiration cycle. Abdomen soft nontender distended. Neurologically intact nonfocal.  Cranial nerves intact. Musculoskeletal system moving all 4 extremities. Skin is warm and moist.  Hematologic system bruising and swelling noted on the right leg. Vascular examination. Patient has Doppler signal right foot. Large hematoma and swelling of the right groin as well. Data Review: Labs are reviewed.  Discussed  Recent Results (from the past 24 hour(s))   EKG, 12 LEAD, INITIAL    Collection Time: 06/03/21  1:37 PM   Result Value Ref Range    Ventricular Rate 92 BPM    Atrial Rate 107 BPM    QRS Duration 180 ms    Q-T Interval 438 ms    QTC Calculation (Bezet) 541 ms    Calculated R Axis 110 degrees    Calculated T Axis -14 degrees    Diagnosis       Atrial fibrillation  Non-specific intra-ventricular conduction block  Abnormal ECG  No previous ECGs available  Confirmed by [facility] Regina DE JESUS ([id number]) on 6/3/2021 4:26:06 PM     CBC WITH AUTOMATED DIFF    Collection Time: 06/03/21  1:40 PM   Result Value Ref Range    WBC 9.1 4.1 - 11.1 K/uL    RBC 4.19 4. 10 - 5.70 M/uL    HGB 11.8 (L) 12.1 - 17.0 g/dL    HCT 37.3 36.6 - 50.3 %    MCV 89.0 80.0 - 99.0 FL    MCH 28.2 26.0 - 34.0 PG    MCHC 31.6 30.0 - 36.5 g/dL    RDW 13.9 11.5 - 14.5 %    PLATELET 601 658 - 340 K/uL    MPV 11.2 8.9 - 12.9 FL    NRBC 0.0 0.0  WBC    ABSOLUTE NRBC 0.00 0.00 - 0.01 K/uL    NEUTROPHILS 65 32 - 75 %    LYMPHOCYTES 24 12 - 49 %    MONOCYTES 9 5 - 13 %    EOSINOPHILS 1 0 - 7 %    BASOPHILS 1 0 - 1 %    IMMATURE GRANULOCYTES 0 0 - 0.5 %    ABS. NEUTROPHILS 6.0 1.8 - 8.0 K/UL    ABS. LYMPHOCYTES 2.2 0.8 - 3.5 K/UL    ABS. MONOCYTES 0.8 0.0 - 1.0 K/UL    ABS. EOSINOPHILS 0.1 0.0 - 0.4 K/UL    ABS. BASOPHILS 0.1 0.0 - 0.1 K/UL    ABS. IMM.  GRANS. 0.0 0.00 - 0.04 K/UL    DF AUTOMATED     METABOLIC PANEL, BASIC    Collection Time: 06/03/21  1:40 PM   Result Value Ref Range    Sodium 136 136 - 145 mmol/L    Potassium 4.2 3.5 - 5.1 mmol/L    Chloride 104 97 - 108 mmol/L    CO2 27 21 - 32 mmol/L    Anion gap 5 5 - 15 mmol/L    Glucose 108 (H) 65 - 100 mg/dL    BUN 20 6 - 20 mg/dL    Creatinine 1.03 0.70 - 1.30 mg/dL    BUN/Creatinine ratio 19 12 - 20      GFR est AA >60 >60 ml/min/1.73m2    GFR est non-AA >60 >60 ml/min/1.73m2    Calcium 8.7 8.5 - 10.1 mg/dL   BNP    Collection Time: 06/03/21  1:40 PM   Result Value Ref Range    NT pro-BNP 5,040 (H) <450 pg/mL   TROPONIN I    Collection Time: 06/03/21  1:40 PM   Result Value Ref Range    Troponin-I, Qt. <0.05 <0.05 ng/mL   D DIMER    Collection Time: 06/03/21  1:40 PM   Result Value Ref Range    D DIMER 1.56 (H) <0.50 ug/ml(FEU)   CK W/ REFLX CKMB    Collection Time: 06/03/21  1:40 PM   Result Value Ref Range    CK 37.0 (L) 39 - 308 ng/mL   DUPLEX LOWER EXT ARTERY RIGHT    Collection Time: 06/03/21  4:47 PM   Result Value Ref Range    Right CFA prox sys .0 cm/s    Right super femoral prox sys PSV 71.6 cm/s    Right SFA Prox Yayo Ratio 0.6          Imagings reviewed: discussed as below. Assessment:     Active Problems:    Syncope (6/3/2021)      Syncopal episodes (6/3/2021)        Plan:     Ultrasound examination reviewed. Patient does have what looks like a right common femoral artery to right common femoral vein AV fistula most like this is related to the percutaneous access related. And also a pseudoaneurysm which is relatively small size they are bilobed. I could not see obvious neck of the pseudoaneurysm. Patient has about 10 cm sized hematoma next to it. Patient's right foot is perfused. Most likely patient will require surgical intervention of this area. Meanwhile I will organize CT angiogram of the right groin. Due to her severe aortic stenosis I am not sure patient can be put general anesthesia for safe surgery. We will coordinate with cardiologist and possibly transferring patient back to the hospital where they have cardiac anesthesiologist available for further evaluation. Meanwhile I will follow up CT scan report.

## 2021-06-04 NOTE — CONSULTS
Consult    Patient: Sumaya Patel MRN: [id number]  SSN: [id number]    YOB: 1935  Age: 81 y.o. Sex: male      Subjective:      Sumaya Patel is a 81 y.o. male who is being seen for presyncope. Patient with history of coronary disease status post CABG, severe aortic stenosis, paroxysmal atrial fibrillation, status post pacemaker. He has been having some dizzy spells. Recently underwent cardiac catheterization via right femoral artery access. He was walking in his yard yesterday when all of a sudden he started having severe pain in his right groin and swelling. He went to his house and he was feeling dizzy but he never passed out. Denied having any chest pain or shortness of breath. He noticed right lower extremity swelling. This procedure was done in [place]. At that time he was found to have patent grafts and stent. There is a mention by his wife about 1 coronary artery disease but no intervention was deemed necessary. He was being evaluated for TAVR. At the time of my examination he is laying flat and he appears to be comfortable.     Past Medical History:   Diagnosis Date    Arrhythmia 2012    PAROX AFIB    Arthritis     CAD (coronary artery disease)     Cancer ([facility] [address] 2000    BLADDER    Hypertension     Sleep apnea     NO CPAP    Stroke ([facility] [address] 2012    NO RESIDUAL     Past Surgical History:   Procedure Laterality Date    HX CORONARY ARTERY BYPASS GRAFT  2004    HX ORTHOPAEDIC Right     REVERSE SHOULDER REPLACEMENT    HX OTHER SURGICAL  2015    BOTTOM LIP SQUAMOUS CELL REMOVED    HX PACEMAKER PLACEMENT  04/2018    NEUROLOGICAL PROCEDURE UNLISTED  1994    LUMBAR SX    [place] CARDIAC SURG PROCEDURE UNLIST  2005    CARDIAC STENT      Family History   Family history unknown: Yes     Social History     Tobacco Use    Smoking status: Never Smoker    Smokeless tobacco: Never Used   Substance Use Topics    Alcohol use: No     Alcohol/week: 0.0 standard drinks Current Facility-Administered Medications   Medication Dose Route Frequency Provider Last Rate Last Admin    amiodarone (CORDARONE) tablet 100 mg  100 mg Oral DAILY Richar Romero MD        lisinopriL (PRINIVIL, ZESTRIL) tablet 20 mg  20 mg Oral DAILY WITH DINNER Rajendra Romero MD   20 mg at 06/03/21 2053    acetaminophen (TYLENOL) tablet 650 mg  650 mg Oral Q6H PRN Richar Romero MD   650 mg at 06/03/21 2045    Or    acetaminophen (TYLENOL) suppository 650 mg  650 mg Rectal Q6H PRN Richar Romero MD        polyethylene glycol (MIRALAX) packet 17 g  17 g Oral DAILY PRN Richar Romero MD        ondansetron (ZOFRAN ODT) tablet 4 mg  4 mg Oral Q8H PRN Rajendra Romero MD        Or    ondansetron [facility]) injection 4 mg  4 mg IntraVENous Q6H PRN Richar Romero MD        traMADoL [place] [facility]) tablet 50 mg  50 mg Oral Q6H PRN Rajendra Romero MD   50 mg at 06/03/21 1948    atorvastatin (LIPITOR) tablet 80 mg  80 mg Oral QHS Rajendra Romero MD   80 mg at 06/03/21 2129        Allergies   Allergen Reactions    Oxycodone Other (comments)     VOMITTING       Review of Systems:  A comprehensive review of systems was negative except for that written in the History of Present Illness. Objective:     Vitals:    06/03/21 1719 06/03/21 1901 06/03/21 1938 06/04/21 0528   BP: (!) 142/87 116/68 116/68 104/70   Pulse: 98 96 96 97   Resp: 18 18 18 18   Temp: 97.6 °F (36.4 °C) 97.6 °F (36.4 °C) 97.6 °F (36.4 °C) 98.6 °F (37 °C)   SpO2: 100%  100% 98%        Physical Exam:  General:  Alert, cooperative, no distress, appears stated age. Eyes:  Conjunctivae/corneas clear. PERRL, EOMs intact. Fundi benign   Ears:  Normal TMs and external ear canals both ears. Nose: Nares normal. Septum midline. Mucosa normal. No drainage or sinus tenderness.    Mouth/Throat: Lips, mucosa, and tongue normal. Teeth and gums normal.   Neck: Supple, symmetrical, trachea midline, no adenopathy, thyroid: no enlargment/tenderness/nodules, no carotid bruit and no JVD. Back:   Symmetric, no curvature. ROM normal. No CVA tenderness. Lungs:   Clear to auscultation bilaterally. Heart:   Ejection systolic murmur in the aortic area. Abdomen:   Soft, non-tender. Bowel sounds normal. No masses,  No organomegaly. Extremities:  Right lower extremity swelling   Pulses: 2+ and symmetric all extremities. Skin: Skin color, texture, turgor normal. No rashes or lesions   Lymph nodes: Cervical, supraclavicular, and axillary nodes normal.   Neurologic: CNII-XII intact. Normal strength, sensation and reflexes throughout. Assessment:     Hospital Problems  Date Reviewed: 5/6/2019        Codes Class Noted POA    Syncope ICD-10-CM: R55  ICD-9-CM: 780.2  6/3/2021 Unknown        Syncopal episodes ICD-10-CM: R55  ICD-9-CM: 780.2  6/3/2021 Unknown            Patient is an 56-year-old white male with:  1. 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 2.  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 Severe aortic aneurysm  5. Coronary disease status post CABG and PCI  6. 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 7.  Status post pacemaker  8. Paroxysmal atrial fibrillation  9. History of stroke  10. Mixed hyperlipidemia  11. Hypertension    Plan:     CT scan showed multiloculated pseudoaneurysm anterior and lateral to the right common femoral artery. There is a large hematoma within the proximal right thigh musculature. There is no active bleeding noted. Small mid abdominal aortic dissection. Both lumens patent. Bilateral pleural effusion noted. His hemoglobin 11.8, platelet 308. His BNP is elevated while troponin is negative. Kidney function within normal limits. We will consult intervention radiologist to see if thrombin injection can be done.   Case discussed at bedside with Dr. Jules Poster. At this time patient on amiodarone and atorvastatin. I will go ahead and hold off lisinopril for now. Further recommendation depending on clinical progression. Thank you  For involving me in Mr. Acuna  care.     Signed By: Monika Velez MD     2021

## 2021-06-04 NOTE — PROGRESS NOTES
General Daily Progress Note          Patient Name:   Chandler Gray       YOB: 1935       Age:  81 y.o. Admit Date: 6/3/2021      Subjective:     Patient resting the bed not feeling well but no chest pain no shortness of breath no nausea no vomiting             Objective:     Visit Vitals  /62 (BP 1 Location: Left upper arm, BP Patient Position: At rest;Supine)   Pulse 94   Temp 98 °F (36.7 °C)   Resp 18   SpO2 97%        Recent Results (from the past 24 hour(s))   EKG, 12 LEAD, INITIAL    Collection Time: 06/03/21  1:37 PM   Result Value Ref Range    Ventricular Rate 92 BPM    Atrial Rate 107 BPM    QRS Duration 180 ms    Q-T Interval 438 ms    QTC Calculation (Bezet) 541 ms    Calculated R Axis 110 degrees    Calculated T Axis -14 degrees    Diagnosis       Atrial fibrillation  Non-specific intra-ventricular conduction block  Abnormal ECG  No previous ECGs available  Confirmed by [facility] ([id number][facility] on 6/3/2021 4:26:06 PM     CBC WITH AUTOMATED DIFF    Collection Time: 06/03/21  1:40 PM   Result Value Ref Range    WBC 9.1 4.1 - 11.1 K/uL    RBC 4.19 4. 10 - 5.70 M/uL    HGB 11.8 (L) 12.1 - 17.0 g/dL    HCT 37.3 36.6 - 50.3 %    MCV 89.0 80.0 - 99.0 FL    MCH 28.2 26.0 - 34.0 PG    MCHC 31.6 30.0 - 36.5 g/dL    RDW 13.9 11.5 - 14.5 %    PLATELET 990 220 - 833 K/uL    MPV 11.2 8.9 - 12.9 FL    NRBC 0.0 0.0  WBC    ABSOLUTE NRBC 0.00 0.00 - 0.01 K/uL    NEUTROPHILS 65 32 - 75 %    LYMPHOCYTES 24 12 - 49 %    MONOCYTES 9 5 - 13 %    EOSINOPHILS 1 0 - 7 %    BASOPHILS 1 0 - 1 %    IMMATURE GRANULOCYTES 0 0 - 0.5 %    ABS. NEUTROPHILS 6.0 1.8 - 8.0 K/UL    ABS. LYMPHOCYTES 2.2 0.8 - 3.5 K/UL    ABS. MONOCYTES 0.8 0.0 - 1.0 K/UL    ABS. EOSINOPHILS 0.1 0.0 - 0.4 K/UL    ABS. BASOPHILS 0.1 0.0 - 0.1 K/UL    ABS. IMM.  GRANS. 0.0 0.00 - 0.04 K/UL    DF AUTOMATED     METABOLIC PANEL, BASIC    Collection Time: 06/03/21  1:40 PM   Result Value Ref Range    Sodium 136 136 - 145 mmol/L Potassium 4.2 3.5 - 5.1 mmol/L    Chloride 104 97 - 108 mmol/L    CO2 27 21 - 32 mmol/L    Anion gap 5 5 - 15 mmol/L    Glucose 108 (H) 65 - 100 mg/dL    BUN 20 6 - 20 mg/dL    Creatinine 1.03 0.70 - 1.30 mg/dL    BUN/Creatinine ratio 19 12 - 20      GFR est AA >60 >60 ml/min/1.73m2    GFR est non-AA >60 >60 ml/min/1.73m2    Calcium 8.7 8.5 - 10.1 mg/dL   BNP    Collection Time: 06/03/21  1:40 PM   Result Value Ref Range    NT pro-BNP 5,040 (H) <450 pg/mL   TROPONIN I    Collection Time: 06/03/21  1:40 PM   Result Value Ref Range    Troponin-I, Qt. <0.05 <0.05 ng/mL   D DIMER    Collection Time: 06/03/21  1:40 PM   Result Value Ref Range    D DIMER 1.56 (H) <0.50 ug/ml(FEU)   CK W/ REFLX CKMB    Collection Time: 06/03/21  1:40 PM   Result Value Ref Range    CK 37.0 (L) 39 - 308 ng/mL   DUPLEX LOWER EXT ARTERY RIGHT    Collection Time: 06/03/21  4:47 PM   Result Value Ref Range    Right CFA prox sys .0 cm/s    Right super femoral prox sys PSV 71.6 cm/s    Right SFA Prox Yayo Ratio 0.6    CBC WITH AUTOMATED DIFF    Collection Time: 06/04/21  8:00 AM   Result Value Ref Range    WBC 7.2 4.1 - 11.1 K/uL    RBC 3.52 (L) 4.10 - 5.70 M/uL    HGB 10.0 (L) 12.1 - 17.0 g/dL    HCT 30.9 (L) 36.6 - 50.3 %    MCV 87.8 80.0 - 99.0 FL    MCH 28.4 26.0 - 34.0 PG    MCHC 32.4 30.0 - 36.5 g/dL    RDW 14.0 11.5 - 14.5 %    PLATELET 126 924 - 677 K/uL    MPV 11.4 8.9 - 12.9 FL    NRBC 0.0 0.0  WBC    ABSOLUTE NRBC 0.00 0.00 - 0.01 K/uL    NEUTROPHILS 72 32 - 75 %    LYMPHOCYTES 17 12 - 49 %    MONOCYTES 9 5 - 13 %    EOSINOPHILS 1 0 - 7 %    BASOPHILS 1 0 - 1 %    IMMATURE GRANULOCYTES 0 0 - 0.5 %    ABS. NEUTROPHILS 5.3 1.8 - 8.0 K/UL    ABS. LYMPHOCYTES 1.2 0.8 - 3.5 K/UL    ABS. MONOCYTES 0.6 0.0 - 1.0 K/UL    ABS. EOSINOPHILS 0.1 0.0 - 0.4 K/UL    ABS. BASOPHILS 0.0 0.0 - 0.1 K/UL    ABS. IMM.  GRANS. 0.0 0.00 - 0.04 K/UL    DF AUTOMATED     METABOLIC PANEL, COMPREHENSIVE    Collection Time: 06/04/21  8:00 AM   Result Value Ref Range    Sodium 134 (L) 136 - 145 mmol/L    Potassium 4.1 3.5 - 5.1 mmol/L    Chloride 103 97 - 108 mmol/L    CO2 26 21 - 32 mmol/L    Anion gap 5 5 - 15 mmol/L    Glucose 107 (H) 65 - 100 mg/dL    BUN 14 6 - 20 mg/dL    Creatinine 0.79 0.70 - 1.30 mg/dL    BUN/Creatinine ratio 18 12 - 20      GFR est AA >60 >60 ml/min/1.73m2    GFR est non-AA >60 >60 ml/min/1.73m2    Calcium 8.8 8.5 - 10.1 mg/dL    Bilirubin, total 0.8 0.2 - 1.0 mg/dL    AST (SGOT) 8 (L) 15 - 37 U/L    ALT (SGPT) 13 12 - 78 U/L    Alk. phosphatase 81 45 - 117 U/L    Protein, total 5.7 (L) 6.4 - 8.2 g/dL    Albumin 2.7 (L) 3.5 - 5.0 g/dL    Globulin 3.0 2.0 - 4.0 g/dL    A-G Ratio 0.9 (L) 1.1 - 2.2       [unfilled]      Review of Systems    Constitutional: Negative for chills and fever. HENT: Negative. Eyes: Negative. Respiratory: Negative. Cardiovascular: Negative. Gastrointestinal: Negative for abdominal pain and nausea. Skin: Negative. Neurological: Negative. Physical Exam:      Constitutional: pt is oriented to person, place, and time. HENT:   Head: Normocephalic and atraumatic. Eyes: Pupils are equal, round, and reactive to light. EOM are normal.   Cardiovascular: Normal rate, regular rhythm and normal heart sounds. Pulmonary/Chest: Breath sounds normal. No wheezes. No rales. Exhibits no tenderness. Abdominal: Soft. Bowel sounds are normal. There is no abdominal tenderness. There is no rebound and no guarding. Musculoskeletal: Normal range of motion. Neurological: pt is alert and oriented to person, place, and time. CTA ABD ART W RUNOFF W WO CONT   Final Result   1. Multiloculated pseudoaneurysm slightly anterior and lateral to the right   common femoral artery. 2. Lateral and more inferior to this pseudoaneurysm is a large hematoma within   the proximal right thigh musculature without definite arterial phase enhancement   to suggest direct communication or \"active bleeding\"   3.  Small mid abdominal aortic dissection with both lumens patent. 4. 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 5. Bilateral pleural effusions with bibasilar subsegmental atelectasis. Dose reduction: All CT scans at this facility are performed using radiation dose   reduction optimization techniques as appropriate to a performed exam, including   the following: Automated exposure control (AEC), adjustment of the MAA and/or   KUB according to the patient's size, or the patient's use of iterative   reconstruction techniques (ASIR). XR CHEST PORT   Final Result   Bibasilar atelectasis and pleural effusions. Possible hydrostatic   edema. Recent Results (from the past 24 hour(s))   EKG, 12 LEAD, INITIAL    Collection Time: 06/03/21  1:37 PM   Result Value Ref Range    Ventricular Rate 92 BPM    Atrial Rate 107 BPM    QRS Duration 180 ms    Q-T Interval 438 ms    QTC Calculation (Bezet) 541 ms    Calculated R Axis 110 degrees    Calculated T Axis -14 degrees    Diagnosis       Atrial fibrillation  Non-specific intra-ventricular conduction block  Abnormal ECG  No previous ECGs available  Confirmed by [facility], Regina 85 ([id number]) on 6/3/2021 4:26:06 PM     CBC WITH AUTOMATED DIFF    Collection Time: 06/03/21  1:40 PM   Result Value Ref Range    WBC 9.1 4.1 - 11.1 K/uL    RBC 4.19 4. 10 - 5.70 M/uL    HGB 11.8 (L) 12.1 - 17.0 g/dL    HCT 37.3 36.6 - 50.3 %    MCV 89.0 80.0 - 99.0 FL    MCH 28.2 26.0 - 34.0 PG    MCHC 31.6 30.0 - 36.5 g/dL    RDW 13.9 11.5 - 14.5 %    PLATELET 431 918 - 847 K/uL    MPV 11.2 8.9 - 12.9 FL    NRBC 0.0 0.0  WBC    ABSOLUTE NRBC 0.00 0.00 - 0.01 K/uL    NEUTROPHILS 65 32 - 75 %    LYMPHOCYTES 24 12 - 49 %    MONOCYTES 9 5 - 13 %    EOSINOPHILS 1 0 - 7 %    BASOPHILS 1 0 - 1 %    IMMATURE GRANULOCYTES 0 0 - 0.5 %    ABS. NEUTROPHILS 6.0 1.8 - 8.0 K/UL    ABS. LYMPHOCYTES 2.2 0.8 - 3.5 K/UL    ABS. MONOCYTES 0.8 0.0 - 1.0 K/UL    ABS.  EOSINOPHILS 0.1 0.0 - 0.4 K/UL    ABS. BASOPHILS 0.1 0.0 - 0.1 K/UL    ABS. IMM. GRANS. 0.0 0.00 - 0.04 K/UL    DF AUTOMATED     METABOLIC PANEL, BASIC    Collection Time: 06/03/21  1:40 PM   Result Value Ref Range    Sodium 136 136 - 145 mmol/L    Potassium 4.2 3.5 - 5.1 mmol/L    Chloride 104 97 - 108 mmol/L    CO2 27 21 - 32 mmol/L    Anion gap 5 5 - 15 mmol/L    Glucose 108 (H) 65 - 100 mg/dL    BUN 20 6 - 20 mg/dL    Creatinine 1.03 0.70 - 1.30 mg/dL    BUN/Creatinine ratio 19 12 - 20      GFR est AA >60 >60 ml/min/1.73m2    GFR est non-AA >60 >60 ml/min/1.73m2    Calcium 8.7 8.5 - 10.1 mg/dL   BNP    Collection Time: 06/03/21  1:40 PM   Result Value Ref Range    NT pro-BNP 5,040 (H) <450 pg/mL   TROPONIN I    Collection Time: 06/03/21  1:40 PM   Result Value Ref Range    Troponin-I, Qt. <0.05 <0.05 ng/mL   D DIMER    Collection Time: 06/03/21  1:40 PM   Result Value Ref Range    D DIMER 1.56 (H) <0.50 ug/ml(FEU)   CK W/ REFLX CKMB    Collection Time: 06/03/21  1:40 PM   Result Value Ref Range    CK 37.0 (L) 39 - 308 ng/mL   DUPLEX LOWER EXT ARTERY RIGHT    Collection Time: 06/03/21  4:47 PM   Result Value Ref Range    Right CFA prox sys .0 cm/s    Right super femoral prox sys PSV 71.6 cm/s    Right SFA Prox Yayo Ratio 0.6    CBC WITH AUTOMATED DIFF    Collection Time: 06/04/21  8:00 AM   Result Value Ref Range    WBC 7.2 4.1 - 11.1 K/uL    RBC 3.52 (L) 4.10 - 5.70 M/uL    HGB 10.0 (L) 12.1 - 17.0 g/dL    HCT 30.9 (L) 36.6 - 50.3 %    MCV 87.8 80.0 - 99.0 FL    MCH 28.4 26.0 - 34.0 PG    MCHC 32.4 30.0 - 36.5 g/dL    RDW 14.0 11.5 - 14.5 %    PLATELET 548 487 - 155 K/uL    MPV 11.4 8.9 - 12.9 FL    NRBC 0.0 0.0  WBC    ABSOLUTE NRBC 0.00 0.00 - 0.01 K/uL    NEUTROPHILS 72 32 - 75 %    LYMPHOCYTES 17 12 - 49 %    MONOCYTES 9 5 - 13 %    EOSINOPHILS 1 0 - 7 %    BASOPHILS 1 0 - 1 %    IMMATURE GRANULOCYTES 0 0 - 0.5 %    ABS. NEUTROPHILS 5.3 1.8 - 8.0 K/UL    ABS. LYMPHOCYTES 1.2 0.8 - 3.5 K/UL    ABS.  MONOCYTES 0.6 0.0 - 1.0 K/UL    ABS. EOSINOPHILS 0.1 0.0 - 0.4 K/UL    ABS. BASOPHILS 0.0 0.0 - 0.1 K/UL    ABS. IMM. GRANS. 0.0 0.00 - 0.04 K/UL    DF AUTOMATED     METABOLIC PANEL, COMPREHENSIVE    Collection Time: 06/04/21  8:00 AM   Result Value Ref Range    Sodium 134 (L) 136 - 145 mmol/L    Potassium 4.1 3.5 - 5.1 mmol/L    Chloride 103 97 - 108 mmol/L    CO2 26 21 - 32 mmol/L    Anion gap 5 5 - 15 mmol/L    Glucose 107 (H) 65 - 100 mg/dL    BUN 14 6 - 20 mg/dL    Creatinine 0.79 0.70 - 1.30 mg/dL    BUN/Creatinine ratio 18 12 - 20      GFR est AA >60 >60 ml/min/1.73m2    GFR est non-AA >60 >60 ml/min/1.73m2    Calcium 8.8 8.5 - 10.1 mg/dL    Bilirubin, total 0.8 0.2 - 1.0 mg/dL    AST (SGOT) 8 (L) 15 - 37 U/L    ALT (SGPT) 13 12 - 78 U/L    Alk. phosphatase 81 45 - 117 U/L    Protein, total 5.7 (L) 6.4 - 8.2 g/dL    Albumin 2.7 (L) 3.5 - 5.0 g/dL    Globulin 3.0 2.0 - 4.0 g/dL    A-G Ratio 0.9 (L) 1.1 - 2.2         Results     ** No results found for the last 336 hours. **           Labs:     Recent Labs     06/04/21  0800 06/03/21  1340   WBC 7.2 9.1   HGB 10.0* 11.8*   HCT 30.9* 37.3    184     Recent Labs     06/04/21  0800 06/03/21  1340   * 136   K 4.1 4.2    104   CO2 26 27   BUN 14 20   CREA 0.79 1.03   * 108*   CA 8.8 8.7     Recent Labs     06/04/21  0800   ALT 13   AP 81   TBILI 0.8   TP 5.7*   ALB 2.7*   GLOB 3.0     No results for input(s): INR, PTP, APTT, INREXT in the last 72 hours. No results for input(s): FE, TIBC, PSAT, FERR in the last 72 hours. No results found for: FOL, RBCF   No results for input(s): PH, PCO2, PO2 in the last 72 hours.   Recent Labs     06/03/21  1340   TROIQ <0.05     No results found for: CHOL, CHOLX, CHLST, CHOLV, HDL, HDLP, LDL, LDLC, DLDLP, TGLX, TRIGL, TRIGP, CHHD, CHHDX  No results found for: GLUCPOC  No results found for: COLOR, APPRN, SPGRU, REFSG, MISSY, PROTU, GLUCU, KETU, BILU, UROU, EDWIN, LEUKU, GLUKE, EPSU, BACTU, WBCU, RBCU, CASTS, UCRY Assessment:     1. 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 2.  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 Severe aortic aneurysm  5. Coronary disease status post CABG and PCI  6. 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 7.  Status post pacemaker  8. Paroxysmal atrial fibrillation  9. History of stroke  10. Mixed hyperlipidemia  11.   Hypertension      Plan:     Discussed with the cardiology and also discussed with the vascular surgeon yesterday    Discussed with patient wife plan to transfer to [facility] if accepted  By Dr. Harshad Ornelas  [phone]    Office told me to transfer to [facility] under Dr. Harshad Ornelas service      Call transfer center at [address] to transfer to [facility]      Current Facility-Administered Medications:     amiodarone (CORDARONE) tablet 100 mg, 100 mg, Oral, DAILY, Ronaldo Romero MD, 100 mg at 06/04/21 0856    [Held by provider] lisinopriL (PRINIVIL, ZESTRIL) tablet 20 mg, 20 mg, Oral, DAILY WITH DINNER, Rajendra Romero MD, 20 mg at 06/03/21 2053    acetaminophen (TYLENOL) tablet 650 mg, 650 mg, Oral, Q6H PRN, 650 mg at 06/03/21 2045 **OR** acetaminophen (TYLENOL) suppository 650 mg, 650 mg, Rectal, Q6H PRN, Ronaldo Romero MD    polyethylene glycol (MIRALAX) packet 17 g, 17 g, Oral, DAILY PRN, Ronaldo Romero MD    ondansetron (ZOFRAN ODT) tablet 4 mg, 4 mg, Oral, Q8H PRN **OR** ondansetron (ZOFRAN) injection 4 mg, 4 mg, IntraVENous, Q6H PRN, Rajendra Romero MD    traMADoL (ULTRAM) tablet 50 mg, 50 mg, Oral, Q6H PRN, Rajendra Romero MD, 50 mg at 06/03/21 1948    atorvastatin (LIPITOR) tablet 80 mg, 80 mg, Oral, QHS, Rajendra Romero MD, 80 mg at 06/03/21 8206

## 2021-06-05 NOTE — PROGRESS NOTES
Called report to Evelyn Mckeon RN at [facility]. Wife at beside okay with transfer. Telemetry removed.

## 2021-06-09 NOTE — PROGRESS NOTES
Physician Progress Note      PATIENT:               Marisol Cosby  CSN #:                  [id number]  :                       1935  ADMIT DATE:       6/3/2021 1:31 PM  [address] DATE:        2021 8:00 PM  RESPONDING  PROVIDER #:        Yvonne Reed MD          QUERY TEXT:    Pt admitted with syncope. Pt noted to have large right thigh hematoma. If possible, please document in progress notes and discharge summary:    The medical record reflects the following:  Risk Factors: catheterization through the right groin about a week ago for coronary angiogram.    Clinical Indicators:    H Grover CN 6/3-  Patient has a catheterization through the right groin about a week ago for coronary angiogram.  does have what looks like a right common femoral artery to right common femoral vein AV fistula most like this is related to the percutaneous access related. And also a pseudoaneurysm which is relatively small size they are bilobed. I could not see obvious neck of the pseudoaneurysm. Patient has about 10 cm sized hematoma next to it. Duplex Lower Ext R Artery-  There is large hematoma noted right lateral groin with a size 9.3 x 5.4 cm    Treatment: Duplex; CTA Abd Art w/ runoff; Surgical consult    Thank you,  Lito Bowser, RN, BSN, [place], [employer]  Clinical Documentation  [phone]  Options provided:  -- Right thigh hematoma as a postoperative catheterization complication  -- Right thigh hematoma as an expected/inherent condition that occurred postoperatively and not a complication  -- Right thigh hematoma related to other incidental risk factor (please specify) occurring following surgery and not a complication, Please document incidental risk factor.   -- Other - I will add my own diagnosis  -- Disagree - Not applicable / Not valid  -- Disagree - Clinically unable to determine / Unknown  -- Refer to Clinical Documentation Reviewer    PROVIDER RESPONSE TEXT:    Patient has Right thigh hematoma as a postoperative catheterization complication.     Query created by: Gilford Casco on 6/4/2021 12:00 PM      Electronically signed by:  Nichelle Dorsey MD 6/9/2021 3:27 AM

## 2021-07-25 NOTE — DISCHARGE SUMMARY
Discharge Summary       PATIENT ID: Eddie Scott  MRN: [id number]   YOB: 1935    DATE OF ADMISSION: 6/3/2021  1:31 PM    DATE OF DISCHARGE:   PRIMARY CARE PROVIDER: Bc Holman MD     ATTENDING PHYSICIAN: Jose Romero  DISCHARGING PROVIDER: Jose Romero      CONSULTATIONS: IP CONSULT TO GENERAL SURGERY  IP CONSULT TO INTERVENTIONAL RADIOLOGY    PROCEDURES/SURGERIES: * No surgery found *    ADMITTING DIAGNOSES:    Patient Active Problem List    Diagnosis Date Noted    Syncope 06/03/2021    Syncopal episodes 06/03/2021       DISCHARGE DIAGNOSES / PLAN:      1.  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 2.  Hematoma adjacent to the pseudoaneurysm  3.  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  Severe aortic aneurysm  5.  Coronary disease status post CABG and PCI  6.  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 7.  Status post pacemaker  8.  Paroxysmal atrial fibrillation  9.  History of stroke  10.  Mixed hyperlipidemia  11.  Hypertension        DISCHARGE MEDICATIONS:  Discharge Medication List as of 6/4/2021  7:10 PM            NOTIFY YOUR PHYSICIAN FOR ANY OF THE FOLLOWING:   Fever over 101 degrees for 24 hours. Chest pain, shortness of breath, fever, chills, nausea, vomiting, diarrhea, change in mentation, falling, weakness, bleeding. Severe pain or pain not relieved by medications. Or, any other signs or symptoms that you may have questions about. DISPOSITION:  x  Home With:   OT  PT  HH  RN       Long term SNF/Inpatient Rehab    Independent/assisted living    Hospice    Other:       PATIENT CONDITION AT DISCHARGE: Stable      PHYSICAL EXAMINATION AT DISCHARGE:  General:          Alert, cooperative, no distress, appears stated age.      HEENT:           Atraumatic, anicteric sclerae, pink conjunctivae                          No oral ulcers, mucosa moist, throat clear, dentition fair  Neck:               Supple, symmetrical  Lungs:             Clear to auscultation bilaterally. No Wheezing or Rhonchi. No rales. Chest wall:      No tenderness  No Accessory muscle use. Heart:              Regular  rhythm,  No  murmur   No edema  Abdomen:        Soft, non-tender. Not distended. Bowel sounds normal  Extremities:     No cyanosis. No clubbing,                            Skin turgor normal, Capillary refill normal  Skin:                Not pale. Not Jaundiced  No rashes   Psych:             Not anxious or agitated. Neurologic:      Alert, moves all extremities, answers questions appropriately and responds to commands     CTA ABD ART W RUNOFF W WO CONT   Final Result   1. Multiloculated pseudoaneurysm slightly anterior and lateral to the right   common femoral artery. 2. Lateral and more inferior to this pseudoaneurysm is a large hematoma within   the proximal right thigh musculature without definite arterial phase enhancement   to suggest direct communication or \"active bleeding\"   3. Small mid abdominal aortic dissection with both lumens patent. 4. 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 5. Bilateral pleural effusions with bibasilar subsegmental atelectasis. Dose reduction: All CT scans at this facility are performed using radiation dose   reduction optimization techniques as appropriate to a performed exam, including   the following: Automated exposure control (AEC), adjustment of the MAA and/or   KUB according to the patient's size, or the patient's use of iterative   reconstruction techniques (ASIR). DUPLEX LOWER EXT ARTERY RIGHT   Final Result      XR CHEST PORT   Final Result   Bibasilar atelectasis and pleural effusions. Possible hydrostatic   edema. DUPLEX LOWER EXT VENOUS RIGHT    (Results Pending)        No results found for this or any previous visit (from the past 24 hour(s)).        HOSPITAL COURSE:    Patient was seen by the vascular surgeon while in the hospital  Recommend to transfer the patient to [facility] hospital    Transfer center was called and patient was transferred to Dr. Roosevelt Silva service    Signed:   Estrella June MD  7/25/2021  3:13 PM   .

## 2021-08-03 PROBLEM — R55 SYNCOPE: Status: RESOLVED | Noted: 2021-06-03 | Resolved: 2021-08-03

## 2022-03-19 PROBLEM — R55 SYNCOPAL EPISODES: Status: ACTIVE | Noted: 2021-06-03

## 2022-11-28 ENCOUNTER — HOSPITAL ENCOUNTER (OUTPATIENT)
Dept: LAB | Age: 87
Discharge: HOME | End: 2022-11-28
Payer: MEDICARE

## 2022-11-28 LAB
ANION GAP SERPL CALC-SCNC: 8 MMOL/L (ref 3–18)
BASOPHILS # BLD: 0 K/UL (ref 0–0.1)
BASOPHILS NFR BLD: 1 % (ref 0–2)
BUN SERPL-MCNC: 16 MG/DL (ref 7–18)
BUN/CREAT SERPL: 21 (ref 12–20)
CA-I BLD-MCNC: 8.6 MG/DL (ref 8.5–10.1)
CHLORIDE SERPL-SCNC: 106 MMOL/L (ref 100–111)
CK SERPL-CCNC: 40 U/L (ref 39–308)
CO2 SERPL-SCNC: 25 MMOL/L (ref 21–32)
CREAT SERPL-MCNC: 0.76 MG/DL (ref 0.6–1.3)
CRP SERPL-MCNC: 0.5 MG/DL (ref 0–0.3)
DIFFERENTIAL METHOD BLD: ABNORMAL
EOSINOPHIL # BLD: 0.2 K/UL (ref 0–0.4)
EOSINOPHIL NFR BLD: 4 % (ref 0–5)
ERYTHROCYTE [DISTWIDTH] IN BLOOD BY AUTOMATED COUNT: 16.6 % (ref 11.6–14.5)
GLUCOSE SERPL-MCNC: 101 MG/DL (ref 74–99)
HCT VFR BLD AUTO: 33.9 % (ref 36–48)
HGB BLD-MCNC: 11.2 G/DL (ref 13–16)
IMM GRANULOCYTES # BLD AUTO: 0 K/UL (ref 0–0.04)
IMM GRANULOCYTES NFR BLD AUTO: 0 % (ref 0–0.5)
LYMPHOCYTES # BLD: 1.1 K/UL (ref 0.9–3.6)
LYMPHOCYTES NFR BLD: 23 % (ref 21–52)
MCH RBC QN AUTO: 30.3 PG (ref 24–34)
MCHC RBC AUTO-ENTMCNC: 33 G/DL (ref 31–37)
MCV RBC AUTO: 91.6 FL (ref 78–100)
MONOCYTES # BLD: 0.6 K/UL (ref 0.05–1.2)
MONOCYTES NFR BLD: 12 % (ref 3–10)
NEUTS SEG # BLD: 2.9 K/UL (ref 1.8–8)
NEUTS SEG NFR BLD: 60 % (ref 40–73)
NRBC # BLD: 0 K/UL (ref 0–0.01)
NRBC BLD-RTO: 0 PER 100 WBC
PLATELET # BLD AUTO: 181 K/UL (ref 135–420)
PMV BLD AUTO: 10.5 FL (ref 9.2–11.8)
POTASSIUM SERPL-SCNC: 3.9 MMOL/L (ref 3.5–5.5)
RBC # BLD AUTO: 3.7 M/UL (ref 4.35–5.65)
SODIUM SERPL-SCNC: 139 MMOL/L (ref 136–145)
WBC # BLD AUTO: 4.9 K/UL (ref 4.6–13.2)

## 2022-11-28 PROCEDURE — 86140 C-REACTIVE PROTEIN: CPT

## 2022-11-28 PROCEDURE — 85025 COMPLETE CBC W/AUTO DIFF WBC: CPT

## 2022-11-28 PROCEDURE — 82550 ASSAY OF CK (CPK): CPT

## 2022-11-28 PROCEDURE — 80048 BASIC METABOLIC PNL TOTAL CA: CPT

## 2022-12-05 ENCOUNTER — HOSPITAL ENCOUNTER (OUTPATIENT)
Dept: LAB | Age: 87
Discharge: HOME | End: 2022-12-05
Payer: MEDICARE

## 2022-12-05 LAB
ANION GAP SERPL CALC-SCNC: 8 MMOL/L (ref 3–18)
BASOPHILS # BLD: 0.1 K/UL (ref 0–0.1)
BASOPHILS NFR BLD: 1 % (ref 0–2)
BUN SERPL-MCNC: 18 MG/DL (ref 7–18)
BUN/CREAT SERPL: 20 (ref 12–20)
CA-I BLD-MCNC: 9.3 MG/DL (ref 8.5–10.1)
CHLORIDE SERPL-SCNC: 105 MMOL/L (ref 100–111)
CK SERPL-CCNC: 57 U/L (ref 39–308)
CO2 SERPL-SCNC: 26 MMOL/L (ref 21–32)
CREAT SERPL-MCNC: 0.88 MG/DL (ref 0.6–1.3)
DIFFERENTIAL METHOD BLD: ABNORMAL
EOSINOPHIL # BLD: 0.2 K/UL (ref 0–0.4)
EOSINOPHIL NFR BLD: 4 % (ref 0–5)
ERYTHROCYTE [DISTWIDTH] IN BLOOD BY AUTOMATED COUNT: 16.3 % (ref 11.6–14.5)
GLUCOSE SERPL-MCNC: 102 MG/DL (ref 74–99)
HCT VFR BLD AUTO: 34.8 % (ref 36–48)
HGB BLD-MCNC: 11.3 G/DL (ref 13–16)
IMM GRANULOCYTES # BLD AUTO: 0 K/UL (ref 0–0.04)
IMM GRANULOCYTES NFR BLD AUTO: 0 % (ref 0–0.5)
LYMPHOCYTES # BLD: 1.2 K/UL (ref 0.9–3.6)
LYMPHOCYTES NFR BLD: 28 % (ref 21–52)
MCH RBC QN AUTO: 29.7 PG (ref 24–34)
MCHC RBC AUTO-ENTMCNC: 32.5 G/DL (ref 31–37)
MCV RBC AUTO: 91.3 FL (ref 78–100)
MONOCYTES # BLD: 0.5 K/UL (ref 0.05–1.2)
MONOCYTES NFR BLD: 12 % (ref 3–10)
NEUTS SEG # BLD: 2.4 K/UL (ref 1.8–8)
NEUTS SEG NFR BLD: 55 % (ref 40–73)
NRBC # BLD: 0 K/UL (ref 0–0.01)
NRBC BLD-RTO: 0 PER 100 WBC
PLATELET # BLD AUTO: 172 K/UL (ref 135–420)
PMV BLD AUTO: 10.5 FL (ref 9.2–11.8)
POTASSIUM SERPL-SCNC: 4 MMOL/L (ref 3.5–5.5)
RBC # BLD AUTO: 3.81 M/UL (ref 4.35–5.65)
SODIUM SERPL-SCNC: 139 MMOL/L (ref 136–145)
WBC # BLD AUTO: 4.4 K/UL (ref 4.6–13.2)

## 2022-12-05 PROCEDURE — 85025 COMPLETE CBC W/AUTO DIFF WBC: CPT

## 2022-12-05 PROCEDURE — 86140 C-REACTIVE PROTEIN: CPT

## 2022-12-05 PROCEDURE — 80048 BASIC METABOLIC PNL TOTAL CA: CPT

## 2022-12-05 PROCEDURE — 82550 ASSAY OF CK (CPK): CPT

## 2022-12-06 LAB — CRP SERPL-MCNC: 0.5 MG/DL (ref 0–0.3)

## 2023-01-04 ENCOUNTER — ANESTHESIA EVENT (OUTPATIENT)
Dept: SURGERY | Age: 88
End: 2023-01-04
Payer: MEDICARE

## 2023-01-04 RX ORDER — INSULIN LISPRO 100 [IU]/ML
INJECTION, SOLUTION INTRAVENOUS; SUBCUTANEOUS ONCE
Status: CANCELLED | OUTPATIENT
Start: 2023-01-04 | End: 2023-01-04

## 2023-01-04 RX ORDER — CLOPIDOGREL BISULFATE 75 MG/1
75 TABLET ORAL DAILY
COMMUNITY

## 2023-01-04 RX ORDER — METOPROLOL TARTRATE 25 MG/1
12.5 TABLET, FILM COATED ORAL 2 TIMES DAILY
COMMUNITY

## 2023-01-04 RX ORDER — TAMSULOSIN HYDROCHLORIDE 0.4 MG/1
0.4 CAPSULE ORAL DAILY
COMMUNITY

## 2023-01-06 ENCOUNTER — ANESTHESIA (OUTPATIENT)
Dept: SURGERY | Age: 88
End: 2023-01-06
Payer: MEDICARE

## 2023-01-06 ENCOUNTER — HOSPITAL ENCOUNTER (OUTPATIENT)
Age: 88
Setting detail: OUTPATIENT SURGERY
Discharge: HOME | End: 2023-01-06
Attending: SURGERY | Admitting: SURGERY
Payer: MEDICARE

## 2023-01-06 VITALS
BODY MASS INDEX: 29.35 KG/M2 | TEMPERATURE: 97 F | RESPIRATION RATE: 16 BRPM | HEART RATE: 70 BPM | OXYGEN SATURATION: 99 % | SYSTOLIC BLOOD PRESSURE: 182 MMHG | WEIGHT: 205 LBS | HEIGHT: 70 IN | DIASTOLIC BLOOD PRESSURE: 98 MMHG

## 2023-01-06 DIAGNOSIS — R55 VASOVAGAL SYNCOPE: Primary | ICD-10-CM

## 2023-01-06 PROCEDURE — 74011250636 HC RX REV CODE- 250/636: Performed by: NURSE ANESTHETIST, CERTIFIED REGISTERED

## 2023-01-06 PROCEDURE — C1781 MESH (IMPLANTABLE): HCPCS | Performed by: SURGERY

## 2023-01-06 PROCEDURE — 77030041680 HC PNCL ELECSURG SMK EVAC CNMD -B: Performed by: SURGERY

## 2023-01-06 PROCEDURE — 77030012406 HC DRN WND PENRS BARD -A: Performed by: SURGERY

## 2023-01-06 PROCEDURE — 76060000033 HC ANESTHESIA 1 TO 1.5 HR: Performed by: SURGERY

## 2023-01-06 PROCEDURE — 77030013189 HC SLV COMPR SCD HUNT -B: Performed by: SURGERY

## 2023-01-06 PROCEDURE — 2709999900 HC NON-CHARGEABLE SUPPLY: Performed by: SURGERY

## 2023-01-06 PROCEDURE — 77030013079 HC BLNKT BAIR HGGR 3M -A: Performed by: NURSE ANESTHETIST, CERTIFIED REGISTERED

## 2023-01-06 PROCEDURE — 77030008684 HC TU ET CUF COVD -B: Performed by: NURSE ANESTHETIST, CERTIFIED REGISTERED

## 2023-01-06 PROCEDURE — 74011000258 HC RX REV CODE- 258: Performed by: NURSE ANESTHETIST, CERTIFIED REGISTERED

## 2023-01-06 PROCEDURE — 76210000063 HC OR PH I REC FIRST 0.5 HR: Performed by: SURGERY

## 2023-01-06 PROCEDURE — 77030041614 HC WRP CLD THER BREG -B: Performed by: SURGERY

## 2023-01-06 PROCEDURE — 76210000021 HC REC RM PH II 0.5 TO 1 HR: Performed by: SURGERY

## 2023-01-06 PROCEDURE — 77030021678 HC GLIDESCP STAT DISP VERT -B: Performed by: NURSE ANESTHETIST, CERTIFIED REGISTERED

## 2023-01-06 PROCEDURE — 74011000250 HC RX REV CODE- 250: Performed by: NURSE ANESTHETIST, CERTIFIED REGISTERED

## 2023-01-06 PROCEDURE — 88302 TISSUE EXAM BY PATHOLOGIST: CPT

## 2023-01-06 PROCEDURE — 77030002933 HC SUT MCRYL J&J -A: Performed by: SURGERY

## 2023-01-06 PROCEDURE — 74011000250 HC RX REV CODE- 250: Performed by: SURGERY

## 2023-01-06 PROCEDURE — 77030002986 HC SUT PROL J&J -A: Performed by: SURGERY

## 2023-01-06 PROCEDURE — 77030026438 HC STYL ET INTUB CARD -A: Performed by: NURSE ANESTHETIST, CERTIFIED REGISTERED

## 2023-01-06 PROCEDURE — 76010000149 HC OR TIME 1 TO 1.5 HR: Performed by: SURGERY

## 2023-01-06 PROCEDURE — 74011000272 HC RX REV CODE- 272: Performed by: SURGERY

## 2023-01-06 DEVICE — MESH HERN W1.8XL4IN INGUINAL POLYPR PRESHAPED SPERMATIC CRD: Type: IMPLANTABLE DEVICE | Site: GROIN | Status: FUNCTIONAL

## 2023-01-06 RX ORDER — BUPIVACAINE HYDROCHLORIDE 5 MG/ML
INJECTION, SOLUTION EPIDURAL; INTRACAUDAL AS NEEDED
Status: DISCONTINUED | OUTPATIENT
Start: 2023-01-06 | End: 2023-01-06 | Stop reason: HOSPADM

## 2023-01-06 RX ORDER — ROCURONIUM BROMIDE 10 MG/ML
INJECTION, SOLUTION INTRAVENOUS AS NEEDED
Status: DISCONTINUED | OUTPATIENT
Start: 2023-01-06 | End: 2023-01-06 | Stop reason: HOSPADM

## 2023-01-06 RX ORDER — DIPHENHYDRAMINE HYDROCHLORIDE 50 MG/ML
12.5 INJECTION, SOLUTION INTRAMUSCULAR; INTRAVENOUS
Status: DISCONTINUED | OUTPATIENT
Start: 2023-01-06 | End: 2023-01-06 | Stop reason: HOSPADM

## 2023-01-06 RX ORDER — CEFAZOLIN SODIUM 1 G/3ML
INJECTION, POWDER, FOR SOLUTION INTRAMUSCULAR; INTRAVENOUS AS NEEDED
Status: DISCONTINUED | OUTPATIENT
Start: 2023-01-06 | End: 2023-01-06 | Stop reason: HOSPADM

## 2023-01-06 RX ORDER — ONDANSETRON 2 MG/ML
4 INJECTION INTRAMUSCULAR; INTRAVENOUS ONCE
Status: DISCONTINUED | OUTPATIENT
Start: 2023-01-06 | End: 2023-01-06 | Stop reason: HOSPADM

## 2023-01-06 RX ORDER — LIDOCAINE HYDROCHLORIDE 20 MG/ML
INJECTION, SOLUTION EPIDURAL; INFILTRATION; INTRACAUDAL; PERINEURAL AS NEEDED
Status: DISCONTINUED | OUTPATIENT
Start: 2023-01-06 | End: 2023-01-06 | Stop reason: HOSPADM

## 2023-01-06 RX ORDER — ONDANSETRON 2 MG/ML
INJECTION INTRAMUSCULAR; INTRAVENOUS AS NEEDED
Status: DISCONTINUED | OUTPATIENT
Start: 2023-01-06 | End: 2023-01-06 | Stop reason: HOSPADM

## 2023-01-06 RX ORDER — FENTANYL CITRATE 50 UG/ML
INJECTION, SOLUTION INTRAMUSCULAR; INTRAVENOUS AS NEEDED
Status: DISCONTINUED | OUTPATIENT
Start: 2023-01-06 | End: 2023-01-06 | Stop reason: HOSPADM

## 2023-01-06 RX ORDER — SODIUM CHLORIDE 0.9 % (FLUSH) 0.9 %
5-40 SYRINGE (ML) INJECTION AS NEEDED
Status: DISCONTINUED | OUTPATIENT
Start: 2023-01-06 | End: 2023-01-06 | Stop reason: HOSPADM

## 2023-01-06 RX ORDER — LIDOCAINE HYDROCHLORIDE 40 MG/ML
SOLUTION TOPICAL AS NEEDED
Status: DISCONTINUED | OUTPATIENT
Start: 2023-01-06 | End: 2023-01-06 | Stop reason: HOSPADM

## 2023-01-06 RX ORDER — FENTANYL CITRATE 50 UG/ML
50 INJECTION, SOLUTION INTRAMUSCULAR; INTRAVENOUS AS NEEDED
Status: DISCONTINUED | OUTPATIENT
Start: 2023-01-06 | End: 2023-01-06 | Stop reason: HOSPADM

## 2023-01-06 RX ORDER — SODIUM CHLORIDE 0.9 % (FLUSH) 0.9 %
5-40 SYRINGE (ML) INJECTION EVERY 8 HOURS
Status: DISCONTINUED | OUTPATIENT
Start: 2023-01-06 | End: 2023-01-06 | Stop reason: HOSPADM

## 2023-01-06 RX ORDER — NALOXONE HYDROCHLORIDE 0.4 MG/ML
0.1 INJECTION, SOLUTION INTRAMUSCULAR; INTRAVENOUS; SUBCUTANEOUS
Status: DISCONTINUED | OUTPATIENT
Start: 2023-01-06 | End: 2023-01-06 | Stop reason: HOSPADM

## 2023-01-06 RX ORDER — SODIUM CHLORIDE, SODIUM LACTATE, POTASSIUM CHLORIDE, CALCIUM CHLORIDE 600; 310; 30; 20 MG/100ML; MG/100ML; MG/100ML; MG/100ML
25 INJECTION, SOLUTION INTRAVENOUS CONTINUOUS
Status: DISPENSED | OUTPATIENT
Start: 2023-01-06 | End: 2023-01-06

## 2023-01-06 RX ORDER — SODIUM CHLORIDE, SODIUM LACTATE, POTASSIUM CHLORIDE, CALCIUM CHLORIDE 600; 310; 30; 20 MG/100ML; MG/100ML; MG/100ML; MG/100ML
25 INJECTION, SOLUTION INTRAVENOUS CONTINUOUS
Status: DISCONTINUED | OUTPATIENT
Start: 2023-01-06 | End: 2023-01-06 | Stop reason: HOSPADM

## 2023-01-06 RX ORDER — FLUMAZENIL 0.1 MG/ML
0.2 INJECTION INTRAVENOUS
Status: DISCONTINUED | OUTPATIENT
Start: 2023-01-06 | End: 2023-01-06 | Stop reason: HOSPADM

## 2023-01-06 RX ORDER — DEXAMETHASONE SODIUM PHOSPHATE 4 MG/ML
INJECTION, SOLUTION INTRA-ARTICULAR; INTRALESIONAL; INTRAMUSCULAR; INTRAVENOUS; SOFT TISSUE AS NEEDED
Status: DISCONTINUED | OUTPATIENT
Start: 2023-01-06 | End: 2023-01-06 | Stop reason: HOSPADM

## 2023-01-06 RX ORDER — PROPOFOL 10 MG/ML
INJECTION, EMULSION INTRAVENOUS AS NEEDED
Status: DISCONTINUED | OUTPATIENT
Start: 2023-01-06 | End: 2023-01-06 | Stop reason: HOSPADM

## 2023-01-06 RX ADMIN — PROPOFOL 100 MG: 10 INJECTION, EMULSION INTRAVENOUS at 07:35

## 2023-01-06 RX ADMIN — LIDOCAINE HYDROCHLORIDE 100 MG: 20 INJECTION, SOLUTION INTRAVENOUS at 07:35

## 2023-01-06 RX ADMIN — ROCURONIUM BROMIDE 50 MG: 50 INJECTION, SOLUTION INTRAVENOUS at 07:36

## 2023-01-06 RX ADMIN — PHENYLEPHRINE HYDROCHLORIDE 200 MCG: 10 INJECTION INTRAVENOUS at 07:41

## 2023-01-06 RX ADMIN — LIDOCAINE HYDROCHLORIDE 3 ML: 40 SOLUTION TOPICAL at 07:39

## 2023-01-06 RX ADMIN — PHENYLEPHRINE HYDROCHLORIDE 50 MCG/MIN: 10 INJECTION INTRAVENOUS at 07:35

## 2023-01-06 RX ADMIN — ONDANSETRON HYDROCHLORIDE 4 MG: 2 INJECTION, SOLUTION INTRAMUSCULAR; INTRAVENOUS at 07:52

## 2023-01-06 RX ADMIN — SODIUM CHLORIDE, POTASSIUM CHLORIDE, SODIUM LACTATE AND CALCIUM CHLORIDE 25 ML/HR: 600; 310; 30; 20 INJECTION, SOLUTION INTRAVENOUS at 06:57

## 2023-01-06 RX ADMIN — FENTANYL CITRATE 50 MCG: 50 INJECTION, SOLUTION INTRAMUSCULAR; INTRAVENOUS at 08:35

## 2023-01-06 RX ADMIN — FENTANYL CITRATE 50 MCG: 50 INJECTION, SOLUTION INTRAMUSCULAR; INTRAVENOUS at 07:45

## 2023-01-06 RX ADMIN — FENTANYL CITRATE 50 MCG: 50 INJECTION, SOLUTION INTRAMUSCULAR; INTRAVENOUS at 08:55

## 2023-01-06 RX ADMIN — SUGAMMADEX 200 MG: 100 INJECTION, SOLUTION INTRAVENOUS at 08:26

## 2023-01-06 RX ADMIN — CEFAZOLIN SODIUM 2 G: 1 INJECTION, POWDER, FOR SOLUTION INTRAMUSCULAR; INTRAVENOUS at 07:40

## 2023-01-06 RX ADMIN — FENTANYL CITRATE 50 MCG: 50 INJECTION, SOLUTION INTRAMUSCULAR; INTRAVENOUS at 08:48

## 2023-01-06 RX ADMIN — DEXAMETHASONE SODIUM PHOSPHATE 10 MG: 4 INJECTION, SOLUTION INTRAMUSCULAR; INTRAVENOUS at 07:42

## 2023-01-06 NOTE — PROGRESS NOTES
SURGERY    NO CHANGES TO H AND P  R ING HERNIA    PT OPTIMIZED FOR SURGERY  ABOVE AVERAGE RISK  NPO    Sharon Lipscomb

## 2023-01-06 NOTE — ANESTHESIA POSTPROCEDURE EVALUATION
Procedure(s):  RIGHT INGUINAL HERNIA REPAIR with Mesh. general    Anesthesia Post Evaluation      Multimodal analgesia: multimodal analgesia used between 6 hours prior to anesthesia start to PACU discharge  Patient location during evaluation: PACU  Patient participation: complete - patient participated  Level of consciousness: awake  Pain management: adequate  Airway patency: patent  Anesthetic complications: no  Cardiovascular status: acceptable  Respiratory status: acceptable and nasal cannula  Hydration status: acceptable  Comments: Ok to discharge when standard criteria are met.    Post anesthesia nausea and vomiting:  none  Final Post Anesthesia Temperature Assessment:  Normothermia (36.0-37.5 degrees C)      INITIAL Post-op Vital signs:   Vitals Value Taken Time   /95 01/06/23 0837   Temp 36.8 °C (98.3 °F) 01/06/23 0837   Pulse 67 01/06/23 0837   Resp 12 01/06/23 0837   SpO2 100 % 01/06/23 0837

## 2023-01-06 NOTE — ANESTHESIA PREPROCEDURE EVALUATION
Relevant Problems   No relevant active problems       Anesthetic History   No history of anesthetic complications            Review of Systems / Medical History  Patient summary reviewed, nursing notes reviewed and pertinent labs reviewed    Pulmonary        Sleep apnea: CPAP           Neuro/Psych       CVA (2012): no residual symptoms  TIA     Cardiovascular    Hypertension        Dysrhythmias : atrial fibrillation  Pacemaker, past MI, CAD and hyperlipidemia    Exercise tolerance: >4 METS  Comments: CORONARY STENT PLACEMENT 5/2005     TAVR 6/2021     CABG x6 2004      Left ventricular systolic function is normal with an ejection fraction of 55 %     GI/Hepatic/Renal  Within defined limits              Endo/Other        Arthritis and cancer (bladder & metastatic prostate CA)     Other Findings   Comments: Enterococcus faecalis bacteremia on 11/5/2022; completed IV ABX therapy 12/10/22         Physical Exam    Airway  Mallampati: II  TM Distance: 4 - 6 cm  Neck ROM: normal range of motion   Mouth opening: Normal     Cardiovascular  Regular rate and rhythm,  S1 and S2 normal,  no murmur, click, rub, or gallop  Rhythm: regular  Rate: normal         Dental  No notable dental hx       Pulmonary  Breath sounds clear to auscultation               Abdominal  GI exam deferred       Other Findings            Anesthetic Plan    ASA: 3  Anesthesia type: general          Induction: Intravenous  Anesthetic plan and risks discussed with: Patient

## 2023-01-06 NOTE — BRIEF OP NOTE
Brief Postoperative Note    Patient: Chi Gibson  YOB: 1935  MRN: [id number]    Date of Procedure: 1/6/2023     Pre-Op Diagnosis: Right inguinal hernia [K40.90]    Post-Op Diagnosis: Same as preoperative diagnosis. Procedure(s):  RIGHT INGUINAL HERNIA REPAIR with Mesh    Surgeon(s): Nusrat Ochoa MD    Surgical Assistant: Surg Asst-1: Alphonse Loyd    Anesthesia: General     Estimated Blood Loss (mL): Minimal    Complications: None    Specimens:   ID Type Source Tests Collected by Time Destination   1 : Right inguinal hernia sac Preservative Inguinal  Nusrat Ochoa MD 1/6/2023 8462 Pathology        Implants:   Implant Name Type Inv.  Item Serial No.  Lot No. LRB No. Used Action   MESH MARK W1.8XL4IN INGUINAL POLYPR PRESHAPED SPERMATIC CRD - [id number]  MESH MARK W1.8XL4IN INGUINAL POLYPR PRESHAPED SPERMATIC CRD  BARD DAVOL_WD [id number] 1 Implanted       Drains: * No LDAs found *    Findings: PANTALOON    Electronically Signed by Paz Eagle MD on 1/6/2023 at 8:18 AM

## 2023-10-02 ENCOUNTER — HOSPITAL ENCOUNTER (EMERGENCY)
Facility: HOSPITAL | Age: 88
Discharge: HOME | End: 2023-10-02
Attending: EMERGENCY MEDICINE
Payer: MEDICARE

## 2023-10-02 ENCOUNTER — APPOINTMENT (OUTPATIENT)
Facility: HOSPITAL | Age: 88
End: 2023-10-02
Payer: MEDICARE

## 2023-10-02 VITALS
RESPIRATION RATE: 25 BRPM | WEIGHT: 202 LBS | SYSTOLIC BLOOD PRESSURE: 165 MMHG | HEIGHT: 72 IN | TEMPERATURE: 97.9 F | OXYGEN SATURATION: 97 % | DIASTOLIC BLOOD PRESSURE: 89 MMHG | BODY MASS INDEX: 27.36 KG/M2 | HEART RATE: 79 BPM

## 2023-10-02 DIAGNOSIS — R07.9 CHEST PAIN, UNSPECIFIED TYPE: Primary | ICD-10-CM

## 2023-10-02 LAB
ANION GAP SERPL CALC-SCNC: 4 MMOL/L (ref 5–15)
BASOPHILS # BLD: 0 K/UL (ref 0–0.1)
BASOPHILS NFR BLD: 1 % (ref 0–1)
BUN SERPL-MCNC: 27 MG/DL (ref 6–20)
BUN/CREAT SERPL: 28 (ref 12–20)
CA-I BLD-MCNC: 8.5 MG/DL (ref 8.5–10.1)
CHLORIDE SERPL-SCNC: 109 MMOL/L (ref 97–108)
CO2 SERPL-SCNC: 26 MMOL/L (ref 21–32)
CREAT SERPL-MCNC: 0.97 MG/DL (ref 0.7–1.3)
DIFFERENTIAL METHOD BLD: NORMAL
EOSINOPHIL # BLD: 0 K/UL (ref 0–0.4)
EOSINOPHIL NFR BLD: 1 % (ref 0–7)
ERYTHROCYTE [DISTWIDTH] IN BLOOD BY AUTOMATED COUNT: 13.9 % (ref 11.5–14.5)
GLUCOSE SERPL-MCNC: 104 MG/DL (ref 65–100)
HCT VFR BLD AUTO: 40.7 % (ref 36.6–50.3)
HGB BLD-MCNC: 13.7 G/DL (ref 12.1–17)
IMM GRANULOCYTES # BLD AUTO: 0 K/UL (ref 0–0.04)
IMM GRANULOCYTES NFR BLD AUTO: 0 % (ref 0–0.5)
LYMPHOCYTES # BLD: 1.2 K/UL (ref 0.8–3.5)
LYMPHOCYTES NFR BLD: 21 % (ref 12–49)
MCH RBC QN AUTO: 30.6 PG (ref 26–34)
MCHC RBC AUTO-ENTMCNC: 33.7 G/DL (ref 30–36.5)
MCV RBC AUTO: 90.8 FL (ref 80–99)
MONOCYTES # BLD: 0.4 K/UL (ref 0–1)
MONOCYTES NFR BLD: 8 % (ref 5–13)
NEUTS SEG # BLD: 3.8 K/UL (ref 1.8–8)
NEUTS SEG NFR BLD: 69 % (ref 32–75)
NRBC # BLD: 0 K/UL (ref 0–0.01)
NRBC BLD-RTO: 0 PER 100 WBC
PLATELET # BLD AUTO: 159 K/UL (ref 150–400)
PMV BLD AUTO: 9.7 FL (ref 8.9–12.9)
POTASSIUM SERPL-SCNC: 4.2 MMOL/L (ref 3.5–5.1)
RBC # BLD AUTO: 4.48 M/UL (ref 4.1–5.7)
SODIUM SERPL-SCNC: 139 MMOL/L (ref 136–145)
TROPONIN I SERPL HS-MCNC: 28 NG/L (ref 0–76)
TROPONIN I SERPL HS-MCNC: 29 NG/L (ref 0–76)
WBC # BLD AUTO: 5.5 K/UL (ref 4.1–11.1)

## 2023-10-02 PROCEDURE — 36415 COLL VENOUS BLD VENIPUNCTURE: CPT

## 2023-10-02 PROCEDURE — 93005 ELECTROCARDIOGRAM TRACING: CPT | Performed by: EMERGENCY MEDICINE

## 2023-10-02 PROCEDURE — 99285 EMERGENCY DEPT VISIT HI MDM: CPT

## 2023-10-02 PROCEDURE — 80048 BASIC METABOLIC PNL TOTAL CA: CPT

## 2023-10-02 PROCEDURE — 85025 COMPLETE CBC W/AUTO DIFF WBC: CPT

## 2023-10-02 PROCEDURE — 84484 ASSAY OF TROPONIN QUANT: CPT

## 2023-10-02 PROCEDURE — 71045 X-RAY EXAM CHEST 1 VIEW: CPT

## 2023-10-02 ASSESSMENT — PAIN - FUNCTIONAL ASSESSMENT
PAIN_FUNCTIONAL_ASSESSMENT: 0-10
PAIN_FUNCTIONAL_ASSESSMENT: NONE - DENIES PAIN

## 2023-10-02 ASSESSMENT — LIFESTYLE VARIABLES
HOW MANY STANDARD DRINKS CONTAINING ALCOHOL DO YOU HAVE ON A TYPICAL DAY: PATIENT DOES NOT DRINK
HOW OFTEN DO YOU HAVE A DRINK CONTAINING ALCOHOL: NEVER

## 2023-10-02 ASSESSMENT — PAIN SCALES - GENERAL: PAINLEVEL_OUTOF10: 0

## 2023-10-02 ASSESSMENT — HEART SCORE: ECG: 1

## 2023-10-02 NOTE — ED TRIAGE NOTES
C/o chest pain earlier in the evening when outside. Went inside to rest, had relief from chest pain.      Hx pacemaker, CABG, baseline left sided droop per ems    EMS gave 324 ASA and inserted 20 g L AC

## 2023-10-02 NOTE — ED PROVIDER NOTES
[facility] EMERGENCY DEPT  EMERGENCY DEPARTMENT HISTORY AND PHYSICAL EXAM      Date: 10/2/2023  Patient Name: Forest Chapa  MRN: [id number]  YOB: 1935  Date of evaluation: 10/2/2023  Provider: Prem Grace DO   Note Started: 7:17 PM EDT 10/2/23    HISTORY OF PRESENT ILLNESS     Chief Complaint   Patient presents with    Chest Pain       History Provided By: Patient    HPI: Forest Chapa is a 81 y.o. male past medical history significant for the below presenting to the emergency department for evaluation of chest pain lasting approximately 1 to 2 hours. Patient states that he was outside working in his car at the time of onset of symptoms. Reports no other associated symptoms. He is asymptomatic on arrival to the emergency department. He did receive full dose aspirin by EMS during transportation. PAST MEDICAL HISTORY   Past Medical History:  Past Medical History:   Diagnosis Date    Arrhythmia 01/01/2012    PAROX AFIB    Arthritis     CAD (coronary artery disease)     Cancer ([address]) 01/01/2000    BLADDER    Hypertension     Sleep apnea     NO CPAP    Stroke ([address]) 01/01/2012    NO RESIDUAL    Thromboembolus ([address]) 2021    R leg       Past Surgical History:  Past Surgical History:   Procedure Laterality Date    CARDIAC VALVE SURGERY      unsure which valve    CORONARY ARTERY BYPASS GRAFT  2004    NEUROLOGICAL SURGERY  1994    LUMBAR SX    ORTHOPEDIC SURGERY Right     REVERSE SHOULDER REPLACEMENT    OTHER SURGICAL HISTORY  2015    BOTTOM LIP SQUAMOUS CELL REMOVED    PACEMAKER PLACEMENT  04/2018    LA UNLISTED PROCEDURE CARDIAC SURGERY  2005    CARDIAC STENT       Family History:  History reviewed. No pertinent family history. Social History:  Social History     Tobacco Use    Smoking status: Never    Smokeless tobacco: Never   Substance Use Topics    Alcohol use: No     Alcohol/week: 0.0 standard drinks of alcohol    Drug use: No       Allergies:   Allergies   Allergen Reactions    Oxycodone

## 2023-10-02 NOTE — ED NOTES
RN assumed care of patient at this time whit. Bedside/verbal report received from [facility] Rn at this time. Patient AAOx4 in semi-fowlers position. Patient on continuous cardiac monitoring, BP , and SpO2. Stretcher in lowest locked position and call bell within reach.        Teresita Serna RN  10/1935

## 2023-10-03 LAB
EKG ATRIAL RATE: 68 BPM
EKG DIAGNOSIS: NORMAL
EKG P AXIS: 66 DEGREES
EKG P-R INTERVAL: 218 MS
EKG Q-T INTERVAL: 444 MS
EKG QRS DURATION: 180 MS
EKG QTC CALCULATION (BAZETT): 472 MS
EKG R AXIS: 108 DEGREES
EKG T AXIS: 12 DEGREES
EKG VENTRICULAR RATE: 68 BPM

## 2023-10-03 NOTE — ED NOTES
Discharge education included and the need for follow-up with PCP. Patient verbalized understanding and denied further questions at this time. Patient ambulatory with steady gait at discharge.       Teresita Serna RN  10/02/23 2031

## 2023-10-03 NOTE — DISCHARGE INSTRUCTIONS
Thank you! Thank you for allowing me to care for you in the emergency department. It is my goal to provide you with excellent care. If you have not received excellent quality care, please ask to speak to the nurse manager. Please fill out the survey that will come to you by mail or email since we listen to your feedback! Below you will find a list of your tests from today's visit. Should you have any questions, please do not hesitate to call the emergency department.     Labs  Recent Results (from the past 12 hour(s))   Basic Metabolic Panel    Collection Time: 10/02/23  5:27 PM   Result Value Ref Range    Sodium 139 136 - 145 mmol/L    Potassium 4.2 3.5 - 5.1 mmol/L    Chloride 109 (H) 97 - 108 mmol/L    CO2 26 21 - 32 mmol/L    Anion Gap 4 (L) 5 - 15 mmol/L    Glucose 104 (H) 65 - 100 mg/dL    BUN 27 (H) 6 - 20 mg/dL    Creatinine 0.97 0.70 - 1.30 mg/dL    Bun/Cre Ratio 28 (H) 12 - 20      Est, Glom Filt Rate >60 >60 ml/min/1.73m2    Calcium 8.5 8.5 - 10.1 mg/dL   CBC with Auto Differential    Collection Time: 10/02/23  5:27 PM   Result Value Ref Range    WBC 5.5 4.1 - 11.1 K/uL    RBC 4.48 4.10 - 5.70 M/uL    Hemoglobin 13.7 12.1 - 17.0 g/dL    Hematocrit 40.7 36.6 - 50.3 %    MCV 90.8 80.0 - 99.0 FL    MCH 30.6 26.0 - 34.0 PG    MCHC 33.7 30.0 - 36.5 g/dL    RDW 13.9 11.5 - 14.5 %    Platelets 126 408 - 993 K/uL    MPV 9.7 8.9 - 12.9 FL    Nucleated RBCs 0.0 0.0  WBC    nRBC 0.00 0.00 - 0.01 K/uL    Neutrophils % 69 32 - 75 %    Lymphocytes % 21 12 - 49 %    Monocytes % 8 5 - 13 %    Eosinophils % 1 0 - 7 %    Basophils % 1 0 - 1 %    Immature Granulocytes 0 0 - 0.5 %    Neutrophils Absolute 3.8 1.8 - 8.0 K/UL    Lymphocytes Absolute 1.2 0.8 - 3.5 K/UL    Monocytes Absolute 0.4 0.0 - 1.0 K/UL    Eosinophils Absolute 0.0 0.0 - 0.4 K/UL    Basophils Absolute 0.0 0.0 - 0.1 K/UL    Absolute Immature Granulocyte 0.0 0.00 - 0.04 K/UL    Differential Type AUTOMATED     Troponin    Collection Time:

## (undated) DEVICE — DRAIN WND PENRS RADPQ 0.25X12 --

## (undated) DEVICE — SYRINGE BLB 60 CC TIP PROTECTOR STRL LF

## (undated) DEVICE — SUTURE MCRYL SZ 4-0 L18IN ABSRB UD L19MM PS-2 3/8 CIR PRIM Y496G

## (undated) DEVICE — NEEDLE HYPO 25GA L1IN BLU POLYPR HUB S STL REG BVL STR W/O

## (undated) DEVICE — INTENDED FOR TISSUE SEPARATION, AND OTHER PROCEDURES THAT REQUIRE A SHARP SURGICAL BLADE TO PUNCTURE OR CUT.: Brand: BARD-PARKER SAFETY BLADES SIZE 15, STERILE

## (undated) DEVICE — TUBING, SUCTION, 9/32" X 10', STRAIGHT: Brand: MEDLINE

## (undated) DEVICE — ELECTRODE PT RET AD L9FT HI MOIST COND ADH HYDRGEL CORDED

## (undated) DEVICE — SUTURE PROL SZ 0 L30IN NONABSORBABLE BLU L26MM CT-2 1/2 CIR 8412H

## (undated) DEVICE — INTENDED FOR TISSUE SEPARATION, AND OTHER PROCEDURES THAT REQUIRE A SHARP SURGICAL BLADE TO PUNCTURE OR CUT.: Brand: BARD-PARKER SAFETY BLADES SIZE 10, STERILE

## (undated) DEVICE — (D)SLEEVE RMFG SCD CALF SCF RG -- DISC BY MFR

## (undated) DEVICE — SYRINGE 20ML LL S/C 50

## (undated) DEVICE — TOWEL,OR,DSP,ST,BLUE,STD,4/PK,20PK/CS: Brand: MEDLINE

## (undated) DEVICE — GOWN,AURORA,FABRIC-REINFORCED,X-LARGE: Brand: MEDLINE

## (undated) DEVICE — STERILE POLYISOPRENE POWDER-FREE SURGICAL GLOVES: Brand: PROTEXIS

## (undated) DEVICE — ADHESIVE DERMABOND TOPICAL SKIN MINI .36ML

## (undated) DEVICE — PENCIL SMK EVAC L10FT TBNG NONSTICK ESU BLDE PLUMEPEN ELITE

## (undated) DEVICE — COUNTER NDL 40 COUNT HLD 70 FOAM BLK ADH W/ MAG

## (undated) DEVICE — YANKAUER,BULB TIP,W/O VENT,RIGID,STERILE: Brand: MEDLINE

## (undated) DEVICE — CLEANER,CAUTERY TIP,2X2",STERILE: Brand: MEDLINE

## (undated) DEVICE — PACK,UNIVERSAL,SPLIT,II,AURORA: Brand: MEDLINE

## (undated) DEVICE — SPONGE LAP SOFT 18X18 IN X RAY DETECTABLE

## (undated) DEVICE — GOWN SURG RNFRCD TOWEL XL STRL

## (undated) DEVICE — 3M™ TEGADERM™ HP TRANSPARENT FILM DRESSING FRAME STYLE, 9546HP, 4 IN X 4-1/2 IN (10 CM X 11.5 CM), 50/CT 4CT/CASE: Brand: 3M™ TEGADERM™

## (undated) DEVICE — GLOVE SURG SZ 65 THK91MIL LTX FREE SYN POLYISOPRENE

## (undated) DEVICE — GLOVE ORANGE PI 7   MSG9070

## (undated) DEVICE — SPONGE GZ 4X4 IN 16-PLY DETECTABLE W/ DMT MSTR TAG

## (undated) DEVICE — SUTURE MCRYL SZ 2-0 L36IN ABSRB UD L36MM CT-1 1/2 CIR Y945H

## (undated) DEVICE — SUTURE MCRYL SZ 3-0 L27IN ABSRB UD L26MM SH 1/2 CIR Y416H

## (undated) DEVICE — MARKER SKN REG TIP W/RULER -- STRL

## (undated) DEVICE — SPONGE: SPECIALTY PEANUT XR 100/CS: Brand: MEDICAL ACTION INDUSTRIES

## (undated) DEVICE — GAUZE,SPONGE,4"X4",16PLY,STRL,LF,10/TRAY: Brand: MEDLINE

## (undated) DEVICE — GLOVE ORANGE PI 8   MSG9080

## (undated) DEVICE — APPLICATOR MEDICATED 26 CC SOLUTION HI LT ORNG CHLORAPREP